# Patient Record
Sex: FEMALE | Race: ASIAN | Employment: OTHER | URBAN - METROPOLITAN AREA
[De-identification: names, ages, dates, MRNs, and addresses within clinical notes are randomized per-mention and may not be internally consistent; named-entity substitution may affect disease eponyms.]

---

## 2017-03-09 PROBLEM — K21.9 GASTROESOPHAGEAL REFLUX DISEASE WITHOUT ESOPHAGITIS: Status: ACTIVE | Noted: 2017-03-09

## 2018-01-15 PROBLEM — M17.0 PRIMARY OSTEOARTHRITIS OF BOTH KNEES: Status: ACTIVE | Noted: 2018-01-15

## 2018-02-14 PROCEDURE — 88305 TISSUE EXAM BY PATHOLOGIST: CPT | Performed by: RADIOLOGY

## 2018-02-22 PROCEDURE — 82570 ASSAY OF URINE CREATININE: CPT | Performed by: INTERNAL MEDICINE

## 2018-02-22 PROCEDURE — 82043 UR ALBUMIN QUANTITATIVE: CPT | Performed by: INTERNAL MEDICINE

## 2018-03-15 PROCEDURE — 88360 TUMOR IMMUNOHISTOCHEM/MANUAL: CPT | Performed by: RADIOLOGY

## 2018-03-15 PROCEDURE — 88341 IMHCHEM/IMCYTCHM EA ADD ANTB: CPT | Performed by: RADIOLOGY

## 2018-03-15 PROCEDURE — 88305 TISSUE EXAM BY PATHOLOGIST: CPT | Performed by: RADIOLOGY

## 2018-03-15 PROCEDURE — 88342 IMHCHEM/IMCYTCHM 1ST ANTB: CPT | Performed by: RADIOLOGY

## 2018-04-20 ENCOUNTER — OFFICE VISIT (OUTPATIENT)
Dept: SURGERY | Facility: CLINIC | Age: 73
End: 2018-04-20

## 2018-04-20 VITALS
DIASTOLIC BLOOD PRESSURE: 82 MMHG | TEMPERATURE: 99 F | BODY MASS INDEX: 27.79 KG/M2 | RESPIRATION RATE: 18 BRPM | WEIGHT: 151 LBS | HEIGHT: 62 IN | HEART RATE: 79 BPM | SYSTOLIC BLOOD PRESSURE: 139 MMHG | OXYGEN SATURATION: 99 %

## 2018-04-20 DIAGNOSIS — D05.11 BREAST NEOPLASM, TIS (DCIS), RIGHT: Primary | ICD-10-CM

## 2018-04-20 PROCEDURE — 99205 OFFICE O/P NEW HI 60 MIN: CPT | Performed by: SURGERY

## 2018-04-20 NOTE — PROGRESS NOTES
Breast Surgery New Patient Consultation    This is the first visit for this 81year old woman, referred byslef, who presents for evaluation of right DCIS.     History of Present Illness:   Ms. Ary Novoa is a 81year old woman who presents with history 250 mg by mouth nightly. Disp:  Rfl:    ergocalciferol 88224 units Oral Cap Take 1 capsule (50,000 Units total) by mouth every 7 days. 1 tab po q week Disp: 4 capsule Rfl: 3   LOSARTAN POTASSIUM 50 MG Oral Tab TAKE 1 TABLET (50 MG TOTAL) BY MOUTH DAILY.  Saeed Saunders drainage, earaches, nasal congestion, nose bleeds, snoring, pain in mouth/throat, hoarseness, change in voice, facial trauma.     Respiratory:  The patient denies chronic cough, phlegm, hemoptysis, pleurisy/chest pain, pneumonia, asthma, wheezing, difficult despair. Endocrine: There is no history of poor/slow wound healing, weight loss/gain, fertility or hormone problems, cold intolerance, +thyroid disease. Allergic/Immunologic:  There is no history of hives, hay fever, angioedema or anaphylaxis. vertebral column tenderness. Skin: The skin appears normal. There are no suspicious appearing rashes or lesions. Extremities: The extremities are without deformity, cyanosis or edema.     Impression:   Ms. Leigha Thompson is a 81year old woman presen proceeding with a mastectomy with reconstruction as a result. She has met ith Dr. Hernandez Ocampo to discuss reconstruction plans. I offered her a second opinion regarding the reconstruction. She will contact us if she wishes to have surgery with our group.  The risks

## 2018-04-26 RX ORDER — OMEPRAZOLE 20 MG/1
20 CAPSULE, DELAYED RELEASE ORAL AS NEEDED
COMMUNITY
End: 2018-10-16

## 2018-05-10 PROBLEM — C50.911 MALIGNANT NEOPLASM OF RIGHT BREAST IN FEMALE, ESTROGEN RECEPTOR POSITIVE, UNSPECIFIED SITE OF BREAST (HCC): Status: ACTIVE | Noted: 2018-05-10

## 2018-05-10 PROBLEM — M17.0 PRIMARY OSTEOARTHRITIS OF BOTH KNEES: Status: RESOLVED | Noted: 2018-01-15 | Resolved: 2018-05-10

## 2018-05-10 PROBLEM — Z17.0 MALIGNANT NEOPLASM OF RIGHT BREAST IN FEMALE, ESTROGEN RECEPTOR POSITIVE, UNSPECIFIED SITE OF BREAST (HCC): Status: ACTIVE | Noted: 2018-05-10

## 2018-05-10 PROCEDURE — 82746 ASSAY OF FOLIC ACID SERUM: CPT | Performed by: INTERNAL MEDICINE

## 2018-05-10 PROCEDURE — 82607 VITAMIN B-12: CPT | Performed by: INTERNAL MEDICINE

## 2018-05-11 ENCOUNTER — ANESTHESIA EVENT (OUTPATIENT)
Dept: SURGERY | Facility: HOSPITAL | Age: 73
End: 2018-05-11

## 2018-05-11 NOTE — PAT NURSING NOTE
Faxed chemistry report to ,surgeon to advise of elevated potassium.  Also faxed hematology report to confirm whether CBC done on 2/33/18 is acceptable for surgery

## 2018-05-14 ENCOUNTER — TELEPHONE (OUTPATIENT)
Dept: MAMMOGRAPHY | Facility: HOSPITAL | Age: 73
End: 2018-05-14

## 2018-05-14 NOTE — TELEPHONE ENCOUNTER
Spoke with patient regarding [place] Lymph Node mapping done in Nuclear Medicine department before mastectomy Thursday. Procedure explained and all questions answered.   Breast Care Coordinator to provide education and written information regarding mastec

## 2018-05-17 ENCOUNTER — HOSPITAL ENCOUNTER (INPATIENT)
Facility: HOSPITAL | Age: 73
LOS: 1 days | Discharge: HOME HEALTH | DRG: 581 | End: 2018-05-18
Attending: SURGERY | Admitting: SURGERY
Payer: MEDICARE

## 2018-05-17 ENCOUNTER — ANESTHESIA (OUTPATIENT)
Dept: SURGERY | Facility: HOSPITAL | Age: 73
End: 2018-05-17

## 2018-05-17 ENCOUNTER — HOSPITAL ENCOUNTER (OUTPATIENT)
Dept: NUCLEAR MEDICINE | Facility: HOSPITAL | Age: 73
Discharge: HOME | DRG: 581 | End: 2018-05-17
Attending: SURGERY
Payer: MEDICARE

## 2018-05-17 ENCOUNTER — SURGERY (OUTPATIENT)
Age: 73
End: 2018-05-17

## 2018-05-17 DIAGNOSIS — D05.11 DUCTAL CARCINOMA IN SITU (DCIS) OF RIGHT BREAST: ICD-10-CM

## 2018-05-17 DIAGNOSIS — D24.1 PAPILLOMA OF RIGHT BREAST: ICD-10-CM

## 2018-05-17 DIAGNOSIS — D64.9 ANEMIA, UNSPECIFIED TYPE: Primary | ICD-10-CM

## 2018-05-17 PROCEDURE — 78195 LYMPH SYSTEM IMAGING: CPT | Performed by: SURGERY

## 2018-05-17 PROCEDURE — 88341 IMHCHEM/IMCYTCHM EA ADD ANTB: CPT | Performed by: SURGERY

## 2018-05-17 PROCEDURE — 88332 PATH CONSLTJ SURG EA ADD BLK: CPT | Performed by: SURGERY

## 2018-05-17 PROCEDURE — 82962 GLUCOSE BLOOD TEST: CPT

## 2018-05-17 PROCEDURE — 88331 PATH CONSLTJ SURG 1 BLK 1SPC: CPT | Performed by: SURGERY

## 2018-05-17 PROCEDURE — 88342 IMHCHEM/IMCYTCHM 1ST ANTB: CPT | Performed by: SURGERY

## 2018-05-17 PROCEDURE — 88307 TISSUE EXAM BY PATHOLOGIST: CPT | Performed by: SURGERY

## 2018-05-17 PROCEDURE — 88305 TISSUE EXAM BY PATHOLOGIST: CPT | Performed by: SURGERY

## 2018-05-17 PROCEDURE — 84132 ASSAY OF SERUM POTASSIUM: CPT

## 2018-05-17 PROCEDURE — A4216 STERILE WATER/SALINE, 10 ML: HCPCS

## 2018-05-17 PROCEDURE — 0HRT0JZ REPLACEMENT OF RIGHT BREAST WITH SYNTHETIC SUBSTITUTE, OPEN APPROACH: ICD-10-PCS | Performed by: PLASTIC SURGERY

## 2018-05-17 PROCEDURE — 07B50ZX EXCISION OF RIGHT AXILLARY LYMPHATIC, OPEN APPROACH, DIAGNOSTIC: ICD-10-PCS | Performed by: SURGERY

## 2018-05-17 DEVICE — MATRIX TISS 20X16CM ALDRM THK: Type: IMPLANTABLE DEVICE | Site: BREAST | Status: FUNCTIONAL

## 2018-05-17 RX ORDER — GABAPENTIN 300 MG/1
300 CAPSULE ORAL NIGHTLY
Status: DISCONTINUED | OUTPATIENT
Start: 2018-05-17 | End: 2018-05-18

## 2018-05-17 RX ORDER — ENOXAPARIN SODIUM 100 MG/ML
40 INJECTION SUBCUTANEOUS DAILY
Status: DISCONTINUED | OUTPATIENT
Start: 2018-05-17 | End: 2018-05-18

## 2018-05-17 RX ORDER — GABAPENTIN 300 MG/1
300 CAPSULE ORAL NIGHTLY
COMMUNITY
End: 2018-11-15

## 2018-05-17 RX ORDER — METOCLOPRAMIDE HYDROCHLORIDE 5 MG/ML
10 INJECTION INTRAMUSCULAR; INTRAVENOUS AS NEEDED
Status: DISCONTINUED | OUTPATIENT
Start: 2018-05-17 | End: 2018-05-17 | Stop reason: HOSPADM

## 2018-05-17 RX ORDER — MEPERIDINE HYDROCHLORIDE 25 MG/ML
12.5 INJECTION INTRAMUSCULAR; INTRAVENOUS; SUBCUTANEOUS AS NEEDED
Status: DISCONTINUED | OUTPATIENT
Start: 2018-05-17 | End: 2018-05-17 | Stop reason: HOSPADM

## 2018-05-17 RX ORDER — METOCLOPRAMIDE HYDROCHLORIDE 5 MG/ML
10 INJECTION INTRAMUSCULAR; INTRAVENOUS EVERY 6 HOURS PRN
Status: DISCONTINUED | OUTPATIENT
Start: 2018-05-17 | End: 2018-05-18

## 2018-05-17 RX ORDER — LEVOTHYROXINE SODIUM 0.1 MG/1
100 TABLET ORAL
Status: DISCONTINUED | OUTPATIENT
Start: 2018-05-18 | End: 2018-05-18

## 2018-05-17 RX ORDER — NALOXONE HYDROCHLORIDE 0.4 MG/ML
80 INJECTION, SOLUTION INTRAMUSCULAR; INTRAVENOUS; SUBCUTANEOUS AS NEEDED
Status: DISCONTINUED | OUTPATIENT
Start: 2018-05-17 | End: 2018-05-17 | Stop reason: HOSPADM

## 2018-05-17 RX ORDER — DEXTROSE MONOHYDRATE 25 G/50ML
50 INJECTION, SOLUTION INTRAVENOUS
Status: DISCONTINUED | OUTPATIENT
Start: 2018-05-17 | End: 2018-05-18

## 2018-05-17 RX ORDER — CLINDAMYCIN PHOSPHATE 900 MG/50ML
900 INJECTION INTRAVENOUS ONCE
Status: DISCONTINUED | OUTPATIENT
Start: 2018-05-17 | End: 2018-05-17 | Stop reason: HOSPADM

## 2018-05-17 RX ORDER — SODIUM CHLORIDE, SODIUM LACTATE, POTASSIUM CHLORIDE, CALCIUM CHLORIDE 600; 310; 30; 20 MG/100ML; MG/100ML; MG/100ML; MG/100ML
INJECTION, SOLUTION INTRAVENOUS CONTINUOUS
Status: DISCONTINUED | OUTPATIENT
Start: 2018-05-17 | End: 2018-05-18

## 2018-05-17 RX ORDER — SODIUM CHLORIDE, SODIUM LACTATE, POTASSIUM CHLORIDE, CALCIUM CHLORIDE 600; 310; 30; 20 MG/100ML; MG/100ML; MG/100ML; MG/100ML
INJECTION, SOLUTION INTRAVENOUS CONTINUOUS
Status: DISCONTINUED | OUTPATIENT
Start: 2018-05-17 | End: 2018-05-17 | Stop reason: HOSPADM

## 2018-05-17 RX ORDER — DIAZEPAM 5 MG/1
5 TABLET ORAL AS NEEDED
Status: DISCONTINUED | OUTPATIENT
Start: 2018-05-17 | End: 2018-05-17 | Stop reason: HOSPADM

## 2018-05-17 RX ORDER — LEVOTHYROXINE SODIUM 0.1 MG/1
100 TABLET ORAL
COMMUNITY
End: 2018-10-18

## 2018-05-17 RX ORDER — LIDOCAINE AND PRILOCAINE 25; 25 MG/G; MG/G
CREAM TOPICAL ONCE
Status: DISCONTINUED | OUTPATIENT
Start: 2018-05-17 | End: 2018-05-17 | Stop reason: HOSPADM

## 2018-05-17 RX ORDER — ACETAMINOPHEN 325 MG/1
650 TABLET ORAL EVERY 4 HOURS PRN
Status: DISCONTINUED | OUTPATIENT
Start: 2018-05-17 | End: 2018-05-18

## 2018-05-17 RX ORDER — PANTOPRAZOLE SODIUM 20 MG/1
20 TABLET, DELAYED RELEASE ORAL
Status: DISCONTINUED | OUTPATIENT
Start: 2018-05-18 | End: 2018-05-18

## 2018-05-17 RX ORDER — HYDROMORPHONE HYDROCHLORIDE 1 MG/ML
0.4 INJECTION, SOLUTION INTRAMUSCULAR; INTRAVENOUS; SUBCUTANEOUS EVERY 2 HOUR PRN
Status: DISCONTINUED | OUTPATIENT
Start: 2018-05-17 | End: 2018-05-18

## 2018-05-17 RX ORDER — CALCIUM CARBONATE 200(500)MG
500 TABLET,CHEWABLE ORAL 3 TIMES DAILY PRN
Status: DISCONTINUED | OUTPATIENT
Start: 2018-05-17 | End: 2018-05-18

## 2018-05-17 RX ORDER — MELOXICAM 7.5 MG/1
7.5 TABLET ORAL DAILY
Status: ON HOLD | COMMUNITY
End: 2018-05-18

## 2018-05-17 RX ORDER — CLINDAMYCIN PHOSPHATE 900 MG/50ML
900 INJECTION INTRAVENOUS EVERY 8 HOURS
Status: COMPLETED | OUTPATIENT
Start: 2018-05-17 | End: 2018-05-18

## 2018-05-17 RX ORDER — HYDROMORPHONE HYDROCHLORIDE 1 MG/ML
0.4 INJECTION, SOLUTION INTRAMUSCULAR; INTRAVENOUS; SUBCUTANEOUS EVERY 5 MIN PRN
Status: DISCONTINUED | OUTPATIENT
Start: 2018-05-17 | End: 2018-05-17 | Stop reason: HOSPADM

## 2018-05-17 RX ORDER — CLINDAMYCIN PHOSPHATE 900 MG/50ML
INJECTION INTRAVENOUS
Status: DISCONTINUED | OUTPATIENT
Start: 2018-05-17 | End: 2018-05-17

## 2018-05-17 RX ORDER — DIPHENHYDRAMINE HYDROCHLORIDE 50 MG/ML
12.5 INJECTION INTRAMUSCULAR; INTRAVENOUS AS NEEDED
Status: DISCONTINUED | OUTPATIENT
Start: 2018-05-17 | End: 2018-05-17 | Stop reason: HOSPADM

## 2018-05-17 RX ORDER — ACETAMINOPHEN 500 MG
1000 TABLET ORAL ONCE
Status: ON HOLD | COMMUNITY
End: 2018-05-18

## 2018-05-17 RX ORDER — LOSARTAN POTASSIUM 50 MG/1
50 TABLET ORAL DAILY
COMMUNITY
End: 2018-08-18

## 2018-05-17 RX ORDER — LOSARTAN POTASSIUM 50 MG/1
50 TABLET ORAL DAILY
Status: DISCONTINUED | OUTPATIENT
Start: 2018-05-17 | End: 2018-05-18

## 2018-05-17 RX ORDER — BISACODYL 10 MG
10 SUPPOSITORY, RECTAL RECTAL
Status: DISCONTINUED | OUTPATIENT
Start: 2018-05-17 | End: 2018-05-18

## 2018-05-17 RX ORDER — DEXTROSE MONOHYDRATE 25 G/50ML
50 INJECTION, SOLUTION INTRAVENOUS
Status: DISCONTINUED | OUTPATIENT
Start: 2018-05-17 | End: 2018-05-17 | Stop reason: HOSPADM

## 2018-05-17 RX ORDER — ONDANSETRON 2 MG/ML
4 INJECTION INTRAMUSCULAR; INTRAVENOUS EVERY 6 HOURS PRN
Status: DISPENSED | OUTPATIENT
Start: 2018-05-17 | End: 2018-05-18

## 2018-05-17 RX ORDER — DOCUSATE SODIUM 100 MG/1
100 CAPSULE, LIQUID FILLED ORAL 2 TIMES DAILY
Status: DISCONTINUED | OUTPATIENT
Start: 2018-05-17 | End: 2018-05-18

## 2018-05-17 RX ORDER — HYDROMORPHONE HYDROCHLORIDE 1 MG/ML
INJECTION, SOLUTION INTRAMUSCULAR; INTRAVENOUS; SUBCUTANEOUS
Status: COMPLETED
Start: 2018-05-17 | End: 2018-05-17

## 2018-05-17 RX ORDER — ONDANSETRON 4 MG/1
4 TABLET, FILM COATED ORAL EVERY 8 HOURS PRN
Status: DISCONTINUED | OUTPATIENT
Start: 2018-05-17 | End: 2018-05-18

## 2018-05-17 RX ORDER — ISOSULFAN BLUE 50 MG/5ML
INJECTION, SOLUTION SUBCUTANEOUS AS NEEDED
Status: DISCONTINUED | OUTPATIENT
Start: 2018-05-17 | End: 2018-05-17 | Stop reason: HOSPADM

## 2018-05-17 RX ORDER — ONDANSETRON 2 MG/ML
4 INJECTION INTRAMUSCULAR; INTRAVENOUS AS NEEDED
Status: DISCONTINUED | OUTPATIENT
Start: 2018-05-17 | End: 2018-05-17 | Stop reason: HOSPADM

## 2018-05-17 NOTE — PROGRESS NOTES
Dr. Jason Alarcon paged regarding new consult. 1530: Dr. Jason Alarcon notified of new consult. Will see patient this afternoon.

## 2018-05-17 NOTE — H&P
[address] [facility] Patient Status:  Surgery Admit    1945 MRN [id number]   Location [address] [place] PRE OP HOLDING Attending Saray Robert MD   Hosp Day # 0 PCP Michael Constantino MD     Active Problems:    * No active hospital pro

## 2018-05-17 NOTE — OPERATIVE REPORT
Pre-Operative Diagnosis: Papilloma of right breast [D24.1]  Ductal carcinoma in situ (DCIS) of right breast [D05.11]     Post-Operative Diagnosis: Papilloma of right breast [D24. 1]Ductal carcinoma in situ (DCIS) of right breast [D05.11]      Procedure Perf The patient was preoperatively marked in the  standing upright position.    IV antibiotics were hung, SCDs were placed.  The patient was then brought to the  operating room, prepped and draped in usual sterile fashion, placed  supine on the operating room

## 2018-05-17 NOTE — ANESTHESIA PREPROCEDURE EVALUATION
PRE-OP EVALUATION    Patient Name: Cherri Lutz    Pre-op Diagnosis: Papilloma of right breast [D24.1]  Ductal carcinoma in situ (DCIS) of right breast [D05.11]    Procedure(s):  RIGHT BREAST TOTAL MASTECTOMY WITH RIGHT SENTINEL LYMPH NODE BIOPSY ( mouth as needed. Disp:  Rfl:    MAGNESIUM OR Take by mouth as needed. Disp:  Rfl:    Vitamin B-12 1000 MCG Oral Tab Take 1,000 mcg by mouth daily. Disp:  Rfl:    niacin 250 MG Oral Tab Take 250 mg by mouth nightly as needed.    Disp:  Rfl:    ergocalciferol 35.7 02/22/2018   MCV 64.3 (L) 02/22/2018   MCH 19.6 (L) 02/22/2018   MCHC 30.5 (L) 02/22/2018   RDW 18.7 (H) 02/22/2018    02/22/2018       Lab Results  Component Value Date    05/09/2018   K 5.20 (H) 05/10/2018    05/09/2018   CO2 27. 3

## 2018-05-17 NOTE — BRIEF OP NOTE
Pre-Operative Diagnosis: Papilloma of right breast [D24.1]  Ductal carcinoma in situ (DCIS) of right breast [D05.11]     Post-Operative Diagnosis: Papilloma of right breast [D24. 1]Ductal carcinoma in situ (DCIS) of right breast [D05.11]      Procedure Perf

## 2018-05-17 NOTE — ANESTHESIA POSTPROCEDURE EVALUATION
[address] [facility] Patient Status:  Surgery Admit   Age/Gender 81year old female MRN [id number]   Location [address] Attending Fanny Lema MD   Hosp Day # 0 PCP Ralf Rashid MD       Anesthesia Pos

## 2018-05-17 NOTE — PROGRESS NOTES
Patient c/o belching and nausea with small teal color emesis. Pt reports she did drink lemon-lime Gatorade before surgery as directed. Pt on full liquid diet but as not had much of an appetite. reglan given. Pt also requesting tums for heartburn.  Dr. Cassie Foster

## 2018-05-17 NOTE — IMAGING NOTE
Assisted nuclear med tech with SLN mapping of right breast for Mica Parsons today. Procedure explained and Mica Parsons verbalized understanding. Emotional support provided.  Written and verbal education given re lymphedema, mastecomy surgery and post

## 2018-05-17 NOTE — CONSULTS
General Medicine Consult      Reason for consult: post-op medical management     Consulted by: Dr. Prince Shea    PCP: Karon Hedrick MD    History of Present Illness:   Patient is a 81year old female with PMH sig for DM, HTN, hypothyroidism, breast canc MetFORMIN HCl  MG Oral Tablet 24 Hr Take 1 tablet (500 mg total) by mouth daily with breakfast. Disp: 90 tablet Rfl: 1   Diclofenac Sodium 75 MG Oral Tab EC Take 1 tablet (75 mg total) by mouth 2 (two) times daily. (Patient taking differently:  Take no oropharyngeal lesions  Lungs: CTA, good effort  CV: nl S1/S2  GI: soft/NT/ND +BS  Ext: nonedematous b/l LE, no clubbing or cyanosis   Neuro: moving all extremities  Psych: normal mood, normal affect  Skin: no rashes, no lesions, post-op dressing on righ records reviewed.      Jim Smith  Internal Medicine  [facility] Hospitalist  Pager: [phone]

## 2018-05-17 NOTE — H&P
Pre-op Diagnosis: Papilloma of right breast [D24.1]  Ductal carcinoma in situ (DCIS) of right breast [D05.11]    The above referenced H&P was reviewed by Carol Ayala MD on 5/17/2018, the patient was examined and no significant changes have occurred in th

## 2018-05-18 VITALS
DIASTOLIC BLOOD PRESSURE: 56 MMHG | HEART RATE: 92 BPM | OXYGEN SATURATION: 97 % | BODY MASS INDEX: 27.87 KG/M2 | SYSTOLIC BLOOD PRESSURE: 132 MMHG | TEMPERATURE: 99 F | HEIGHT: 62 IN | WEIGHT: 151.44 LBS | RESPIRATION RATE: 16 BRPM

## 2018-05-18 PROCEDURE — 82962 GLUCOSE BLOOD TEST: CPT

## 2018-05-18 PROCEDURE — 85027 COMPLETE CBC AUTOMATED: CPT | Performed by: PLASTIC SURGERY

## 2018-05-18 PROCEDURE — 80048 BASIC METABOLIC PNL TOTAL CA: CPT | Performed by: INTERNAL MEDICINE

## 2018-05-18 NOTE — PROGRESS NOTES
Pt d/c home. D/c instructions given to pt, pt's , & pt's dtr, including review of all home meds, diet, hydration, activity wound care & extensive kody drain care. Verbalized understanding of all instructions. Left unit stable via w/c.

## 2018-05-18 NOTE — PLAN OF CARE
Problem: Patient/Family Goals  Goal: Patient/Family Short Term Goal  Patient's Short Term Goal: Have adequate pain control and be free from nausea    Interventions:   - use pain medications as needed  -continue to transition to po medications when able  -a respiratory difficulty  - Respiratory Therapy support as indicated  - Manage/alleviate anxiety  - Monitor for signs/symptoms of CO2 retention   Outcome: Completed Date Met: 05/18/18  Lungs clear bilat. Maintaining sats on ra. Enc db&c & is use.      Probl adequate nutritional intake and initiate nutrition consult as needed  - Instruct patient on self management of diabetes   Outcome: Progressing  Cont accu checks & novolog as ordered

## 2018-05-18 NOTE — PLAN OF CARE
GASTROINTESTINAL - ADULT    • Minimal or absence of nausea and vomiting Progressing    • Maintains or returns to baseline bowel function Progressing        GENITOURINARY - ADULT    • Absence of urinary retention Progressing        PAIN - ADULT    • Tamela Grossman

## 2018-05-18 NOTE — HOME CARE LIAISON
[facility] NOT CONTRACTED WITH [facility] INSURANCE LEFT MESSAGE WITH AICHA SALDIVAR TO RE REFER PTNT      Thank you    Conchita Crespo

## 2018-05-18 NOTE — PROGRESS NOTES
[facility]  Progress Note    Magno Crane Patient Status:  Outpatient in a Bed    1945 MRN [id number]   [facility] 3NW-A Attending Velma Goddard MD   Hosp Day # 0 PCP Serafin Amador MD     Subjective:    Patient sitting u antibiotics at home  Drain output log for home  F/U with Dr Jcarlos Steward next week  likely home this afternoon.    D/w Dr Keshia Green, [address]  General Surgery  5/18/2018

## 2018-05-18 NOTE — PROGRESS NOTES
jose oneal from gen surgery here. States ok to d/c home. Dr Alba Maldonado paged @ this time to discuss poc d/c plans. Awaiting return call.  kody drain teaching given to pt & pt's family, along with printed material from [employer].

## 2018-05-18 NOTE — PAYOR COMM NOTE
--------------  ADMISSION REVIEW     Payor: [employer] ADV [employer]  Subscriber #:  [id number]  Authorization Number: [id number]    Admit date: N/A  Admit time: N/A       Admitting Physician: Tracy Kasper MD  Attending Physician:  Tracy Kasper MD  Primary (DILAUDID) 1 MG/ML injection 0.4 mg     Date Action Dose Route User    5/17/2018 1404 Given 0.1 mg Intravenous McGearlinAnkita, RN    5/17/2018 1359 Given 0.4 mg Intravenous SaurabhinAnkita, RN    5/17/2018 1348 Given 0.2 mg Intravenous Violetta Steen PERFORMED:  Right total mastectomy and right sentinel lymph node biopsy.   Right breast reconstruction with alloderm placement direct to implant     s/p Right breast total Mastectomy with right sentinel LN biopsy   # Papilloma of right breast and ductal car

## 2018-05-18 NOTE — HOME CARE LIAISON
TNL RE REFERRED PTNT TO [facility]  THEY ARE NOT IN NETWORK WITH PTNT [facility] AWARE AND WAS CALLING PTNT    Stephen Mayberry

## 2018-05-18 NOTE — PROGRESS NOTES
[facility] Hospitalist Progress Note                                                                   [address] [facility]  7/7/1945    CC: f/u s/p mastectomy    SUBJECTIVE:    Post op pain managed wit Glucose-Vitamin C, Calcium Carbonate Antacid    Assessment/Plan:  Active Problems:    Ductal carcinoma in situ (DCIS) of right breast    # s/p Right breast total Mastectomy with right sentinel LN biopsy   # Papilloma of right breast and ductal carcinoma in

## 2018-05-22 NOTE — CM/SW NOTE
Call received from CM from [facility]. Pt was to be set up with Tania Lopez at [facility] but has not yet received services. [facility] referral to [facility]. Referral sent - awaiting reply. 1130: BARBARA Fleming at [facility].  Per [facility] they have submitte

## 2018-05-30 PROCEDURE — 87205 SMEAR GRAM STAIN: CPT | Performed by: PLASTIC SURGERY

## 2018-05-30 PROCEDURE — 87075 CULTR BACTERIA EXCEPT BLOOD: CPT | Performed by: PLASTIC SURGERY

## 2018-05-30 PROCEDURE — 87070 CULTURE OTHR SPECIMN AEROBIC: CPT | Performed by: PLASTIC SURGERY

## 2018-10-18 ENCOUNTER — LAB ENCOUNTER (OUTPATIENT)
Dept: LAB | Age: 73
End: 2018-10-18
Attending: ORTHOPAEDIC SURGERY
Payer: MEDICARE

## 2018-10-18 DIAGNOSIS — Z01.812 PRE-OPERATIVE LABORATORY EXAMINATION: ICD-10-CM

## 2018-10-18 DIAGNOSIS — Z01.818 PREOP TESTING: ICD-10-CM

## 2018-10-18 PROCEDURE — 86850 RBC ANTIBODY SCREEN: CPT

## 2018-10-18 PROCEDURE — 36415 COLL VENOUS BLD VENIPUNCTURE: CPT | Performed by: INTERNAL MEDICINE

## 2018-10-18 PROCEDURE — 83021 HEMOGLOBIN CHROMOTOGRAPHY: CPT | Performed by: INTERNAL MEDICINE

## 2018-10-18 PROCEDURE — 87081 CULTURE SCREEN ONLY: CPT

## 2018-10-18 PROCEDURE — 86900 BLOOD TYPING SEROLOGIC ABO: CPT

## 2018-10-18 PROCEDURE — 86901 BLOOD TYPING SEROLOGIC RH(D): CPT

## 2018-10-18 NOTE — H&P (VIEW-ONLY)
Blanca Mulligan is a 72year old female who presents for a pre-operative physical exam.   Blanca Mulligan is scheduled for a R total knee procedure at 10/25/10 on [facility]  performed by Dr Shemar Fowler.  Indication: R osteoarthritis    HPI related to graham Tablet 24 Hr TAKE 1 TABLET DAILY WITH BREAKFAST. Disp: 90 tablet Rfl: 2   LOSARTAN POTASSIUM 50 MG Oral Tab TAKE 1 TABLET (50 MG TOTAL) BY MOUTH DAILY.  Disp: 90 tablet Rfl: 2   Blood Glucose Monitoring Suppl (ACCU-CHEK STARLA SMARTVIEW) w/Device Does not marco difficulty ambulating other than excepted b/l knee OA  PSYCH: denies depressed mood, anxiety  ENDOCRINE: denies cold or heat intolerance, increased thirst    PHYSICAL EXAM:   /74   Pulse 68   Temp 97.4 °F (36.3 °C) (Oral)   Resp 16   Ht 5' 2\" (1.575 Chloride      101 - 111 mmol/L 102   Carbon Dioxide, Total      22.0 - 32.0 mmol/L 26.1   CALCIUM      8.3 - 10.3 mg/dL 9.7   TOTAL PROTEIN      6.1 - 8.3 g/dL 8.3   Albumin      3.5 - 4.8 g/dL 3.8   Total Bilirubin      0.10 - 2.00 mg/dL 0.42   ALKALINE

## 2018-10-23 NOTE — H&P
[facility]    PATIENT'S NAME: Kelly Nowak   ATTENDING PHYSICIAN: Shaq Larson MD   PATIENT ACCOUNT#:   [id number]    LOCATION:  [facility]  MEDICAL RECORD #:   [id number]       YOB: 1945  ADMISSION DATE:       10/25/201 nontender. Normal bowel sounds. GENITALIA/RECTAL:  Deferred. EXTREMITIES:  The hips are nontender to internal and external rotation. Bilateral knees have crepitus of the patellofemoral joint and pain along the medial joint space.   She is lacking marco

## 2018-10-25 ENCOUNTER — ANESTHESIA EVENT (OUTPATIENT)
Dept: SURGERY | Facility: HOSPITAL | Age: 73
DRG: 470 | End: 2018-10-25
Payer: MEDICARE

## 2018-10-25 ENCOUNTER — APPOINTMENT (OUTPATIENT)
Dept: GENERAL RADIOLOGY | Facility: HOSPITAL | Age: 73
DRG: 470 | End: 2018-10-25
Attending: PHYSICIAN ASSISTANT
Payer: MEDICARE

## 2018-10-25 ENCOUNTER — ANESTHESIA (OUTPATIENT)
Dept: SURGERY | Facility: HOSPITAL | Age: 73
DRG: 470 | End: 2018-10-25
Payer: MEDICARE

## 2018-10-25 ENCOUNTER — HOSPITAL ENCOUNTER (INPATIENT)
Facility: HOSPITAL | Age: 73
LOS: 4 days | Discharge: NURSING, REHAB OR LONG-TERM CARE | DRG: 470 | End: 2018-10-29
Attending: ORTHOPAEDIC SURGERY | Admitting: ORTHOPAEDIC SURGERY
Payer: MEDICARE

## 2018-10-25 DIAGNOSIS — M17.11 PRIMARY OSTEOARTHRITIS OF RIGHT KNEE: ICD-10-CM

## 2018-10-25 DIAGNOSIS — Z01.818 PREOP TESTING: Primary | ICD-10-CM

## 2018-10-25 PROCEDURE — 82962 GLUCOSE BLOOD TEST: CPT

## 2018-10-25 PROCEDURE — 88305 TISSUE EXAM BY PATHOLOGIST: CPT | Performed by: ORTHOPAEDIC SURGERY

## 2018-10-25 PROCEDURE — 88311 DECALCIFY TISSUE: CPT | Performed by: ORTHOPAEDIC SURGERY

## 2018-10-25 PROCEDURE — 73560 X-RAY EXAM OF KNEE 1 OR 2: CPT | Performed by: PHYSICIAN ASSISTANT

## 2018-10-25 PROCEDURE — A4216 STERILE WATER/SALINE, 10 ML: HCPCS | Performed by: PHYSICIAN ASSISTANT

## 2018-10-25 PROCEDURE — 0SRC0J9 REPLACEMENT OF RIGHT KNEE JOINT WITH SYNTHETIC SUBSTITUTE, CEMENTED, OPEN APPROACH: ICD-10-PCS | Performed by: ORTHOPAEDIC SURGERY

## 2018-10-25 DEVICE — PSN TIB STM 5 DEG SZ D R: Type: IMPLANTABLE DEVICE | Site: KNEE | Status: FUNCTIONAL

## 2018-10-25 DEVICE — IMPLANTABLE DEVICE: Type: IMPLANTABLE DEVICE | Site: KNEE | Status: FUNCTIONAL

## 2018-10-25 RX ORDER — SODIUM CHLORIDE, SODIUM LACTATE, POTASSIUM CHLORIDE, CALCIUM CHLORIDE 600; 310; 30; 20 MG/100ML; MG/100ML; MG/100ML; MG/100ML
INJECTION, SOLUTION INTRAVENOUS CONTINUOUS
Status: DISCONTINUED | OUTPATIENT
Start: 2018-10-25 | End: 2018-10-29

## 2018-10-25 RX ORDER — HYDROCODONE BITARTRATE AND ACETAMINOPHEN 5; 325 MG/1; MG/1
2 TABLET ORAL AS NEEDED
Status: DISCONTINUED | OUTPATIENT
Start: 2018-10-25 | End: 2018-10-25 | Stop reason: HOSPADM

## 2018-10-25 RX ORDER — SODIUM CHLORIDE 0.9 % (FLUSH) 0.9 %
10 SYRINGE (ML) INJECTION AS NEEDED
Status: DISCONTINUED | OUTPATIENT
Start: 2018-10-25 | End: 2018-10-29

## 2018-10-25 RX ORDER — ONDANSETRON 2 MG/ML
4 INJECTION INTRAMUSCULAR; INTRAVENOUS EVERY 4 HOURS PRN
Status: DISCONTINUED | OUTPATIENT
Start: 2018-10-25 | End: 2018-10-29

## 2018-10-25 RX ORDER — DEXTROSE MONOHYDRATE 25 G/50ML
50 INJECTION, SOLUTION INTRAVENOUS AS NEEDED
Status: DISCONTINUED | OUTPATIENT
Start: 2018-10-25 | End: 2018-10-29

## 2018-10-25 RX ORDER — SODIUM CHLORIDE 9 MG/ML
INJECTION, SOLUTION INTRAVENOUS CONTINUOUS
Status: DISCONTINUED | OUTPATIENT
Start: 2018-10-25 | End: 2018-10-29

## 2018-10-25 RX ORDER — MORPHINE SULFATE 4 MG/ML
4 INJECTION, SOLUTION INTRAMUSCULAR; INTRAVENOUS EVERY 10 MIN PRN
Status: DISCONTINUED | OUTPATIENT
Start: 2018-10-25 | End: 2018-10-25 | Stop reason: HOSPADM

## 2018-10-25 RX ORDER — MIDAZOLAM HYDROCHLORIDE 1 MG/ML
INJECTION INTRAMUSCULAR; INTRAVENOUS AS NEEDED
Status: DISCONTINUED | OUTPATIENT
Start: 2018-10-25 | End: 2018-10-25 | Stop reason: SURG

## 2018-10-25 RX ORDER — HYDROCODONE BITARTRATE AND ACETAMINOPHEN 10; 325 MG/1; MG/1
2 TABLET ORAL EVERY 4 HOURS PRN
Status: DISCONTINUED | OUTPATIENT
Start: 2018-10-25 | End: 2018-10-27

## 2018-10-25 RX ORDER — ACETAMINOPHEN 500 MG
1000 TABLET ORAL EVERY 6 HOURS PRN
COMMUNITY
End: 2018-12-13 | Stop reason: ALTCHOICE

## 2018-10-25 RX ORDER — ONDANSETRON 2 MG/ML
4 INJECTION INTRAMUSCULAR; INTRAVENOUS ONCE AS NEEDED
Status: DISCONTINUED | OUTPATIENT
Start: 2018-10-25 | End: 2018-10-25 | Stop reason: HOSPADM

## 2018-10-25 RX ORDER — DIPHENHYDRAMINE HYDROCHLORIDE 50 MG/ML
25 INJECTION INTRAMUSCULAR; INTRAVENOUS ONCE AS NEEDED
Status: ACTIVE | OUTPATIENT
Start: 2018-10-25 | End: 2018-10-25

## 2018-10-25 RX ORDER — SENNOSIDES 8.6 MG
17.2 TABLET ORAL NIGHTLY
Status: DISCONTINUED | OUTPATIENT
Start: 2018-10-25 | End: 2018-10-29

## 2018-10-25 RX ORDER — SODIUM PHOSPHATE, DIBASIC AND SODIUM PHOSPHATE, MONOBASIC 7; 19 G/133ML; G/133ML
1 ENEMA RECTAL ONCE AS NEEDED
Status: DISCONTINUED | OUTPATIENT
Start: 2018-10-25 | End: 2018-10-29

## 2018-10-25 RX ORDER — METOCLOPRAMIDE 10 MG/1
10 TABLET ORAL ONCE
Status: DISCONTINUED | OUTPATIENT
Start: 2018-10-25 | End: 2018-10-25 | Stop reason: HOSPADM

## 2018-10-25 RX ORDER — DIPHENHYDRAMINE HCL 25 MG
25 CAPSULE ORAL EVERY 4 HOURS PRN
Status: DISCONTINUED | OUTPATIENT
Start: 2018-10-25 | End: 2018-10-29

## 2018-10-25 RX ORDER — DEXTROSE MONOHYDRATE 25 G/50ML
50 INJECTION, SOLUTION INTRAVENOUS
Status: DISCONTINUED | OUTPATIENT
Start: 2018-10-25 | End: 2018-10-25 | Stop reason: HOSPADM

## 2018-10-25 RX ORDER — MAGNESIUM HYDROXIDE 1200 MG/15ML
LIQUID ORAL CONTINUOUS PRN
Status: COMPLETED | OUTPATIENT
Start: 2018-10-25 | End: 2018-10-25

## 2018-10-25 RX ORDER — DIPHENHYDRAMINE HYDROCHLORIDE 50 MG/ML
12.5 INJECTION INTRAMUSCULAR; INTRAVENOUS EVERY 4 HOURS PRN
Status: DISCONTINUED | OUTPATIENT
Start: 2018-10-25 | End: 2018-10-29

## 2018-10-25 RX ORDER — GABAPENTIN 300 MG/1
300 CAPSULE ORAL NIGHTLY
Status: DISCONTINUED | OUTPATIENT
Start: 2018-10-25 | End: 2018-10-29

## 2018-10-25 RX ORDER — POLYETHYLENE GLYCOL 3350 17 G/17G
17 POWDER, FOR SOLUTION ORAL DAILY PRN
Status: DISCONTINUED | OUTPATIENT
Start: 2018-10-25 | End: 2018-10-29

## 2018-10-25 RX ORDER — BISACODYL 10 MG
10 SUPPOSITORY, RECTAL RECTAL
Status: DISCONTINUED | OUTPATIENT
Start: 2018-10-25 | End: 2018-10-29

## 2018-10-25 RX ORDER — ACETAMINOPHEN 500 MG
1000 TABLET ORAL ONCE
Status: DISCONTINUED | OUTPATIENT
Start: 2018-10-25 | End: 2018-10-25 | Stop reason: HOSPADM

## 2018-10-25 RX ORDER — NALOXONE HYDROCHLORIDE 0.4 MG/ML
80 INJECTION, SOLUTION INTRAMUSCULAR; INTRAVENOUS; SUBCUTANEOUS AS NEEDED
Status: DISCONTINUED | OUTPATIENT
Start: 2018-10-25 | End: 2018-10-25 | Stop reason: HOSPADM

## 2018-10-25 RX ORDER — LIDOCAINE HYDROCHLORIDE 10 MG/ML
INJECTION, SOLUTION EPIDURAL; INFILTRATION; INTRACAUDAL; PERINEURAL AS NEEDED
Status: DISCONTINUED | OUTPATIENT
Start: 2018-10-25 | End: 2018-10-25 | Stop reason: SURG

## 2018-10-25 RX ORDER — MORPHINE SULFATE 10 MG/ML
6 INJECTION, SOLUTION INTRAMUSCULAR; INTRAVENOUS EVERY 10 MIN PRN
Status: DISCONTINUED | OUTPATIENT
Start: 2018-10-25 | End: 2018-10-25 | Stop reason: HOSPADM

## 2018-10-25 RX ORDER — MORPHINE SULFATE 4 MG/ML
4 INJECTION, SOLUTION INTRAMUSCULAR; INTRAVENOUS ONCE
Status: COMPLETED | OUTPATIENT
Start: 2018-10-25 | End: 2018-10-25

## 2018-10-25 RX ORDER — FAMOTIDINE 20 MG/1
20 TABLET ORAL ONCE
Status: DISCONTINUED | OUTPATIENT
Start: 2018-10-25 | End: 2018-10-25 | Stop reason: HOSPADM

## 2018-10-25 RX ORDER — SODIUM CHLORIDE, SODIUM LACTATE, POTASSIUM CHLORIDE, CALCIUM CHLORIDE 600; 310; 30; 20 MG/100ML; MG/100ML; MG/100ML; MG/100ML
INJECTION, SOLUTION INTRAVENOUS CONTINUOUS
Status: DISCONTINUED | OUTPATIENT
Start: 2018-10-25 | End: 2018-10-25 | Stop reason: HOSPADM

## 2018-10-25 RX ORDER — CLINDAMYCIN PHOSPHATE 900 MG/50ML
900 INJECTION INTRAVENOUS EVERY 8 HOURS
Status: COMPLETED | OUTPATIENT
Start: 2018-10-25 | End: 2018-10-26

## 2018-10-25 RX ORDER — HALOPERIDOL 5 MG/ML
0.25 INJECTION INTRAMUSCULAR ONCE AS NEEDED
Status: DISCONTINUED | OUTPATIENT
Start: 2018-10-25 | End: 2018-10-25 | Stop reason: HOSPADM

## 2018-10-25 RX ORDER — DOCUSATE SODIUM 100 MG/1
100 CAPSULE, LIQUID FILLED ORAL 2 TIMES DAILY
Status: DISCONTINUED | OUTPATIENT
Start: 2018-10-25 | End: 2018-10-29

## 2018-10-25 RX ORDER — LEVOTHYROXINE SODIUM 0.1 MG/1
100 TABLET ORAL
Status: DISCONTINUED | OUTPATIENT
Start: 2018-10-26 | End: 2018-10-29

## 2018-10-25 RX ORDER — HYDROCODONE BITARTRATE AND ACETAMINOPHEN 10; 325 MG/1; MG/1
1 TABLET ORAL EVERY 4 HOURS PRN
Status: DISCONTINUED | OUTPATIENT
Start: 2018-10-25 | End: 2018-10-27

## 2018-10-25 RX ORDER — MORPHINE SULFATE 4 MG/ML
2 INJECTION, SOLUTION INTRAMUSCULAR; INTRAVENOUS EVERY 10 MIN PRN
Status: DISCONTINUED | OUTPATIENT
Start: 2018-10-25 | End: 2018-10-25 | Stop reason: HOSPADM

## 2018-10-25 RX ORDER — CELECOXIB 200 MG/1
200 CAPSULE ORAL 2 TIMES DAILY
Status: DISCONTINUED | OUTPATIENT
Start: 2018-10-26 | End: 2018-10-29

## 2018-10-25 RX ORDER — METOCLOPRAMIDE HYDROCHLORIDE 5 MG/ML
10 INJECTION INTRAMUSCULAR; INTRAVENOUS EVERY 6 HOURS PRN
Status: DISPENSED | OUTPATIENT
Start: 2018-10-25 | End: 2018-10-27

## 2018-10-25 RX ORDER — HYDROCODONE BITARTRATE AND ACETAMINOPHEN 5; 325 MG/1; MG/1
1 TABLET ORAL EVERY 4 HOURS PRN
Status: DISCONTINUED | OUTPATIENT
Start: 2018-10-25 | End: 2018-10-25 | Stop reason: DRUGHIGH

## 2018-10-25 RX ORDER — HYDROCODONE BITARTRATE AND ACETAMINOPHEN 5; 325 MG/1; MG/1
1 TABLET ORAL AS NEEDED
Status: DISCONTINUED | OUTPATIENT
Start: 2018-10-25 | End: 2018-10-25 | Stop reason: HOSPADM

## 2018-10-25 RX ORDER — BUPIVACAINE HYDROCHLORIDE 7.5 MG/ML
INJECTION, SOLUTION EPIDURAL; RETROBULBAR AS NEEDED
Status: DISCONTINUED | OUTPATIENT
Start: 2018-10-25 | End: 2018-10-25 | Stop reason: SURG

## 2018-10-25 RX ORDER — ASPIRIN 325 MG
325 TABLET ORAL 2 TIMES DAILY
Status: DISCONTINUED | OUTPATIENT
Start: 2018-10-25 | End: 2018-10-29

## 2018-10-25 RX ORDER — LOSARTAN POTASSIUM 50 MG/1
50 TABLET ORAL DAILY
Status: DISCONTINUED | OUTPATIENT
Start: 2018-10-26 | End: 2018-10-26

## 2018-10-25 RX ADMIN — LIDOCAINE HYDROCHLORIDE 25 MG: 10 INJECTION, SOLUTION EPIDURAL; INFILTRATION; INTRACAUDAL; PERINEURAL at 11:01:00

## 2018-10-25 RX ADMIN — LIDOCAINE HYDROCHLORIDE 50 MG: 10 INJECTION, SOLUTION EPIDURAL; INFILTRATION; INTRACAUDAL; PERINEURAL at 11:06:00

## 2018-10-25 RX ADMIN — BUPIVACAINE HYDROCHLORIDE 1.4 ML: 7.5 INJECTION, SOLUTION EPIDURAL; RETROBULBAR at 11:05:00

## 2018-10-25 RX ADMIN — MIDAZOLAM HYDROCHLORIDE 2 MG: 1 INJECTION INTRAMUSCULAR; INTRAVENOUS at 10:55:00

## 2018-10-25 RX ADMIN — SODIUM CHLORIDE, SODIUM LACTATE, POTASSIUM CHLORIDE, CALCIUM CHLORIDE: 600; 310; 30; 20 INJECTION, SOLUTION INTRAVENOUS at 10:58:00

## 2018-10-25 RX ADMIN — SODIUM CHLORIDE, SODIUM LACTATE, POTASSIUM CHLORIDE, CALCIUM CHLORIDE: 600; 310; 30; 20 INJECTION, SOLUTION INTRAVENOUS at 12:31:00

## 2018-10-25 NOTE — OPERATIVE REPORT
[facility]    PATIENT'S NAME: Misael Hinton   ATTENDING PHYSICIAN: Shaq Walker MD   OPERATING PHYSICIAN: Shaq Walker MD   PATIENT ACCOUNT#:   [id number]    LOCATION:  [facility] [address] PACU [facility] 10  MEDICAL RECORD #:   [id number] supine position. All pressure points well padded with smooth Webril over the proximal right thigh, followed by pneumatic pressure cuff. She was carefully positioned in the supine position with all pressure points well padded.   The right lower extremity w that a 14 poly was appropriate size. With trial components in place, the appropriate orientation of the tibia was marked, after the patella was measured to a size 26, punched, and good tracking was achieved.   The limb was exsanguinated and using an [employer]

## 2018-10-25 NOTE — ANESTHESIA PROCEDURE NOTES
Spinal Block  Performed by: Chandrika Torres, CRNA  Authorized by: Cassie Stephenson MD     Patient Location:  OR  Start Time:  10/25/2018 11:01 AM  End Time:  10/25/2018 11:05 AM  Site identification: surface landmarks    Reason for Block: at surgeon's

## 2018-10-25 NOTE — ANESTHESIA PREPROCEDURE EVALUATION
Anesthesia PreOp Note    HPI:     Leigha Thompson is a 72year old female who presents for preoperative consultation requested by: Mercy Reyna MD    Date of Surgery: 10/25/2018    Procedure(s):  KNEE TOTAL REPLACEMENT  Indication: Primary osteoarth 2018         Medications Prior to Admission:  acetaminophen 500 MG Oral Tab Take 1,000 mg by mouth every 6 (six) hours as needed for Pain (Pre-op).  Disp:  Rfl:  10/25/2018 at 0815   MetFORMIN HCl  MG Oral Tablet 24 Hr TAKE 1 TABLET DAILY WITH BREAKFA 0 Taking       Current Facility-Administered Medications Ordered in Epic:  lactated ringers infusion  Intravenous Continuous Kimberly García MD Last Rate: 20 mL/hr at 10/25/18 0910   acetaminophen (TYLENOL EXTRA STRENGTH) tab 1,000 mg 1,000 mg Oral Onc Value Date    WBC 6.78 10/16/2018    RBC 5.81 (H) 10/16/2018    HGB 11.3 (L) 10/16/2018    HCT 36.1 10/16/2018    MCV 62.1 (L) 10/16/2018    MCH 19.4 (L) 10/16/2018    MCHC 31.3 10/16/2018    RDW 19.1 (H) 10/16/2018     10/16/2018     Lab Results Yesenia  10/25/2018 10:20 AM

## 2018-10-25 NOTE — INTERVAL H&P NOTE
Pre-op Diagnosis: Primary osteoarthritis of right knee [M17.11]    The above referenced H&P was reviewed by Nubia Macias MD on 10/25/2018, the patient was examined and no significant changes have occurred in the patient's condition since the H&P was

## 2018-10-25 NOTE — ANESTHESIA POSTPROCEDURE EVALUATION
Patient: Dakotah Coronel    Procedure Summary     Date:  10/25/18 Room / Location:  [facility] OR [address] [place] [address] OR    Anesthesia Start:  1359 Anesthesia Stop:      Procedure:  KNEE TOTAL REPLACEMENT (Right ) Diagnosis:       Primary osteoarthritis of right k

## 2018-10-25 NOTE — H&P
[facility] Hospitalist Team  History and Physical     ASSESSMENT / PLAN:   73 yo female with HTN, GERD, DM Type II, hypothyroidism, breast cancer S/P Right Mastectomy and OA who presents for knee surgery.  Pt is S/P Right Knee Replacement,see below for details labored, CTA   CARDIOVASCULAR: regular,nl S1 S2; no murmur, no gallop,  no rub   ABDOMEN:  Soft, BS+; non distended, non tender    GENITAL/: krueger  EXTREMITIES: right leg with dressing     PMH  Past Medical History:   Diagnosis Date   • Anemia    • Cance TAKE 1 TABLET DAILY WITH BREAKFAST. Disp: 90 tablet Rfl: 2   [DISCONTINUED] LOSARTAN POTASSIUM 50 MG Oral Tab TAKE 1 TABLET (50 MG TOTAL) BY MOUTH DAILY.  Disp: 90 tablet Rfl: 2   [DISCONTINUED] Levothyroxine Sodium 100 MCG Oral Tab Take 100 mcg by mouth be Dictated by (CST): Pedrito Hendricks MD on 10/25/2018 at 15:09     Approved by (CST): Pedrito Hendricks MD on 10/25/2018 at 15:09              SEE ATTENDING NOTE BELOW      Patient examined and assessed independently. Agree with above APN assessment.      Callie Munoz

## 2018-10-26 PROBLEM — Z01.818 PREOP TESTING: Status: ACTIVE | Noted: 2018-10-26

## 2018-10-26 PROCEDURE — 82962 GLUCOSE BLOOD TEST: CPT

## 2018-10-26 PROCEDURE — 80048 BASIC METABOLIC PNL TOTAL CA: CPT | Performed by: NURSE PRACTITIONER

## 2018-10-26 PROCEDURE — 97530 THERAPEUTIC ACTIVITIES: CPT

## 2018-10-26 PROCEDURE — 97166 OT EVAL MOD COMPLEX 45 MIN: CPT

## 2018-10-26 PROCEDURE — 85014 HEMATOCRIT: CPT | Performed by: NURSE PRACTITIONER

## 2018-10-26 PROCEDURE — 97162 PT EVAL MOD COMPLEX 30 MIN: CPT

## 2018-10-26 PROCEDURE — 85018 HEMOGLOBIN: CPT | Performed by: NURSE PRACTITIONER

## 2018-10-26 PROCEDURE — 97116 GAIT TRAINING THERAPY: CPT

## 2018-10-26 PROCEDURE — 83036 HEMOGLOBIN GLYCOSYLATED A1C: CPT | Performed by: NURSE PRACTITIONER

## 2018-10-26 PROCEDURE — 85025 COMPLETE CBC W/AUTO DIFF WBC: CPT | Performed by: NURSE PRACTITIONER

## 2018-10-26 RX ORDER — MELATONIN
325 2 TIMES DAILY WITH MEALS
Status: DISCONTINUED | OUTPATIENT
Start: 2018-10-26 | End: 2018-10-29

## 2018-10-26 RX ORDER — FOLIC ACID 1 MG/1
1 TABLET ORAL DAILY
Status: DISCONTINUED | OUTPATIENT
Start: 2018-10-26 | End: 2018-10-29

## 2018-10-26 NOTE — OCCUPATIONAL THERAPY NOTE
OCCUPATIONAL THERAPY EVALUATION - INPATIENT      Room Number: 421/421-A  Evaluation Date: 10/26/2018  Type of Evaluation: Initial  Presenting Problem: r tkr    Physician Order: IP Consult to Occupational Therapy  Reason for Therapy: ADL/IADL Dysfunction an [facility]) 2018    right breast mastectomy   • Diabetes ([address]    • Disorder of thyroid    • Esophageal reflux    • High blood pressure    • HTN (hypertension)    • Hypothyroid    • IFG (impaired fasting glucose)    • Neuropathy    • Osteoarthritis    • PONV (po extremity strength is within functional limits     ACTIVITIES OF DAILY LIVING ASSESSMENT  AM-PAC ‘6-Clicks’ Inpatient Daily Activity Short Form  How much help from another person does the patient currently need…  -   Putting on and taking off regular lower

## 2018-10-26 NOTE — CM/SW NOTE
HANSA met with the pt. And her dtrs. At bedside. The pt. Lives with her  in a tri level home. The are 3 steps down to the family room and 10 up to the bedrooms. The pt. Reports being independent prior to admission with adls, ambulaiton and driving.

## 2018-10-26 NOTE — PROGRESS NOTES
[facility] Hospitalist Team  Progress Note    [facility] Patient Status:  Inpatient    1945 MRN [id number]   Location [facility] 4W/SW/SE Attending Wendy Charles MD   Hosp Day # 1 PCP Michael Constantino MD     CC: Follow Up  PCP: Carmencita Molina source Oral, resp. rate 16, height 157.5 cm (5' 2\"), weight 156 lb 11.2 oz (71.1 kg), SpO2 97 %, not currently breastfeeding.     GENERAL: no apparent distress  NEURO: A/A Ox3  RESP: non labored, CTA  CARDIO: Regular, no murmur  ABD: soft, NT, ND, BS+  EXT DiphenhydrAMINE HCl (BENADRYL) injection 12.5 mg 12.5 mg Intravenous Q4H PRN   aspirin tab 325 mg 325 mg Oral BID   gabapentin (NEURONTIN) cap 300 mg 300 mg Oral Nightly   HYDROcodone-acetaminophen (NORCO)  MG per tab 1 tablet 1 tablet Oral Q4H PRN Knee Replacement.     S/P Right Knee Replacement  -IV/PO prn pain meds.   Monitor mental status and vitals closely  -expected acute blood loss anemia, preop hemoglobin per outpatient chart review 11.3-> 7.4  -krueger  -Bowel reg, antiemetics  -DVT Prophy- ASA

## 2018-10-26 NOTE — PHYSICAL THERAPY NOTE
PHYSICAL THERAPY KNEE EVALUATION - INPATIENT       Room Number: 421/421-A  Evaluation Date: 10/26/2018  Type of Evaluation: Initial  Physician Order: PT Eval and Treat    Presenting Problem: R TKA  Reason for Therapy: Mobility Dysfunction and Discharge Raphael motion;Strengthening;Stoop training;Stair training;Transfer training;Balance training  Rehab Potential : Good  Frequency (Obs): BID       PHYSICAL THERAPY MEDICAL/SOCIAL HISTORY     Problem List  Principal Problem:    Primary osteoarthritis of right knee limits    RANGE OF MOTION AND STRENGTH ASSESSMENT  Upper extremity ROM and strength are within functional limits    Lower extremity ROM is within functional limits except for the following:   Right Knee extension 0  Right Knee flexion 80    Lower extremity activity tolerated  Gait training  Posture  ROM  Strengthening  Lower therapeutic exercise: Ankle pumps  Heel slides  Quad sets  Transfer training    Patient End of Session: Up in chair;Needs met;RN aware of session/findings;Call light within reach; Ice ap Major Depressive Disorder, severe with psychotic features versus Psychosis, unspecified type of psychosis versus Brief Psychotic Episode

## 2018-10-26 NOTE — PHYSICAL THERAPY NOTE
PHYSICAL THERAPY KNEE TREATMENT NOTE - INPATIENT     Room Number: 421/421-A             Presenting Problem: R TKA    Problem List  Principal Problem:    Primary osteoarthritis of right knee  Active Problems:    Preop testing      ASSESSMENT   Patient Costa Najera Repositioning; Activity promotion    BALANCE  Static Sitting: Good  Dynamic Sitting: Good  Static Standing: Fair +  Dynamic Standing: Fair    ACTIVITY TOLERANCE           BP: 137/55  BP Location: Left arm  BP Method: Automatic  Patient Position: Lying Status  CG assist   Goal #3 Patient is able to ambulate 300 feet with assistive device at assistance level: SBA   Goal #3   Current Status  CG assist 150ft RW    Goal #4 Patient will negotiate 10 stairs/one curb w/ assistive device and CG   Goal #4   [place]

## 2018-10-26 NOTE — PROGRESS NOTES
POD#1 s/p Right TKA  Pain controlled on orals  No nausea    Xray reviewed  Lab with post op acute anemia on top of chronic anemia    Dressing clean and dry  Calf's non tender, DNVI    Imp: POD#1 s/p Right TKA     Plan: Xeralto for 4 weeks  Rehab as [employer]

## 2018-10-27 PROCEDURE — 86900 BLOOD TYPING SEROLOGIC ABO: CPT | Performed by: INTERNAL MEDICINE

## 2018-10-27 PROCEDURE — 86920 COMPATIBILITY TEST SPIN: CPT

## 2018-10-27 PROCEDURE — A4216 STERILE WATER/SALINE, 10 ML: HCPCS | Performed by: PHYSICIAN ASSISTANT

## 2018-10-27 PROCEDURE — 85014 HEMATOCRIT: CPT | Performed by: HOSPITALIST

## 2018-10-27 PROCEDURE — 85025 COMPLETE CBC W/AUTO DIFF WBC: CPT | Performed by: NURSE PRACTITIONER

## 2018-10-27 PROCEDURE — 80048 BASIC METABOLIC PNL TOTAL CA: CPT | Performed by: NURSE PRACTITIONER

## 2018-10-27 PROCEDURE — 85027 COMPLETE CBC AUTOMATED: CPT | Performed by: INTERNAL MEDICINE

## 2018-10-27 PROCEDURE — 86901 BLOOD TYPING SEROLOGIC RH(D): CPT | Performed by: INTERNAL MEDICINE

## 2018-10-27 PROCEDURE — 82962 GLUCOSE BLOOD TEST: CPT

## 2018-10-27 PROCEDURE — 85018 HEMOGLOBIN: CPT | Performed by: HOSPITALIST

## 2018-10-27 PROCEDURE — A4216 STERILE WATER/SALINE, 10 ML: HCPCS | Performed by: HOSPITALIST

## 2018-10-27 PROCEDURE — 30233N1 TRANSFUSION OF NONAUTOLOGOUS RED BLOOD CELLS INTO PERIPHERAL VEIN, PERCUTANEOUS APPROACH: ICD-10-PCS | Performed by: HOSPITALIST

## 2018-10-27 PROCEDURE — 36430 TRANSFUSION BLD/BLD COMPNT: CPT

## 2018-10-27 PROCEDURE — 86850 RBC ANTIBODY SCREEN: CPT | Performed by: INTERNAL MEDICINE

## 2018-10-27 RX ORDER — ACETAMINOPHEN 500 MG
500 TABLET ORAL EVERY 4 HOURS PRN
Status: DISCONTINUED | OUTPATIENT
Start: 2018-10-27 | End: 2018-10-29

## 2018-10-27 RX ORDER — SODIUM CHLORIDE 0.9 % (FLUSH) 0.9 %
10 SYRINGE (ML) INJECTION AS NEEDED
Status: DISCONTINUED | OUTPATIENT
Start: 2018-10-27 | End: 2018-10-29

## 2018-10-27 RX ORDER — HYDROCODONE BITARTRATE AND ACETAMINOPHEN 5; 325 MG/1; MG/1
1 TABLET ORAL EVERY 4 HOURS PRN
Status: DISCONTINUED | OUTPATIENT
Start: 2018-10-27 | End: 2018-10-29

## 2018-10-27 RX ORDER — HYDROCODONE BITARTRATE AND ACETAMINOPHEN 10; 325 MG/1; MG/1
1 TABLET ORAL EVERY 4 HOURS PRN
Status: DISCONTINUED | OUTPATIENT
Start: 2018-10-27 | End: 2018-10-29

## 2018-10-27 RX ORDER — SODIUM CHLORIDE 9 MG/ML
INJECTION, SOLUTION INTRAVENOUS ONCE
Status: COMPLETED | OUTPATIENT
Start: 2018-10-27 | End: 2018-10-27

## 2018-10-27 NOTE — PHYSICAL THERAPY NOTE
Pt with low Hgb today 7.4, awaiting for MD decision regarding blood transfusion per RN. Will follow up when pt appropriated.

## 2018-10-27 NOTE — PROGRESS NOTES
[facility] Hospitalist Progress Note     CC: Hospital Follow up    PCP: Asha Winkler MD       Assessment/Plan:     Principal Problem:    Primary osteoarthritis of right knee  Active Problems:    Preop testing    Ms. Benjamin Contreras is a 73 yo female with HTN, GERD, Oral, resp. rate 16, height 157.5 cm (5' 2\"), weight 156 lb 11.2 oz (71.1 kg), SpO2 97 %, not currently breastfeeding.     Temp:  [97.9 °F (36.6 °C)-98 °F (36.7 °C)] 97.9 °F (36.6 °C)  Pulse:  [] 105  Resp:  [16] 16  BP: (118-142)/(43-55) 126/46 Approved by (CST):  Terrell See MD on 10/25/2018 at 15:09              Meds:     • ferrous sulfate  325 mg Oral BID with meals   • folic acid  1 mg Oral Daily   • celecoxib  200 mg Oral BID   • Senna  17.2 mg Oral Nightly   • docusate sodium  100 mg O

## 2018-10-28 ENCOUNTER — APPOINTMENT (OUTPATIENT)
Dept: ULTRASOUND IMAGING | Facility: HOSPITAL | Age: 73
DRG: 470 | End: 2018-10-28
Attending: HOSPITALIST
Payer: MEDICARE

## 2018-10-28 PROCEDURE — 97116 GAIT TRAINING THERAPY: CPT

## 2018-10-28 PROCEDURE — 85027 COMPLETE CBC AUTOMATED: CPT | Performed by: HOSPITALIST

## 2018-10-28 PROCEDURE — 93970 EXTREMITY STUDY: CPT | Performed by: HOSPITALIST

## 2018-10-28 PROCEDURE — 97110 THERAPEUTIC EXERCISES: CPT

## 2018-10-28 PROCEDURE — 82962 GLUCOSE BLOOD TEST: CPT

## 2018-10-28 NOTE — CM/SW NOTE
Clinical updates sent via [employer] to Neris arguelles to continue the insurance authorization.     HERSON gloria [id number]

## 2018-10-28 NOTE — PHYSICAL THERAPY NOTE
Pt is seen BID today. Please refer to AM note for PT recommendation.   Bed mobility:  SBA  Transfers:  CGA with RW  Gait:  300' with RW, CGA  Stairs:  10 steps, 1 rail, 2 hand support, CGA

## 2018-10-28 NOTE — PHYSICAL THERAPY NOTE
PHYSICAL THERAPY KNEE TREATMENT NOTE - INPATIENT     Room Number: 421/421-A          Presenting Problem: R TKA    Problem List  Principal Problem:    Primary osteoarthritis of right knee  Active Problems:    Preop testing      Past Medical History   Past (including adjusting bedclothes, sheets and blankets)?: None   -   Sitting down on and standing up from a chair with arms (e.g., wheelchair, bedside commode, etc.): A Little   -   Moving from lying on back to sitting on the side of the bed?: A Little   How but the SpO2 was at 99%, room air with HR 93 bpm.  The pt is still below her baseline level of function and would still require continued skilled physical therapy while in-house to address pts impairments and functional limitations.   HEP was reviewed with

## 2018-10-28 NOTE — PROGRESS NOTES
[facility] Hospitalist Progress Note     CC: Hospital Follow up    PCP: Kina Jenkins MD       Assessment/Plan:     Principal Problem:    Primary osteoarthritis of right knee  Active Problems:    Preop testing    Ms. Tao Simon is a 73 yo female with HTN, GERD, pain or tightness. Hg 7.4 yesterday -> 9.4 post transfusion -> 8.5 today, no blood loss. Having calf and thigh pain as well. Plans to do stairs with PT this afternoon, nervous and worried about 3 level home.      OBJECTIVE:    Blood pressure 123/45, pulse 8 AMYLASE, LIPASE, LDH in the last 168 hours. Invalid input(s): ALPHOS, TBIL, DBIL, TPROT      Imaging:  Xr Knee (1 Or 2 Views), Right (cpt=[id number])    Result Date: 10/25/2018  CONCLUSION:  * Total knee arthroplasty is identified.  * The prostheses are well

## 2018-10-28 NOTE — PROGRESS NOTES
[address] [facility] Patient Status:  Inpatient    1945 MRN [id number]   Location [facility] 4W/SW/SE Attending Sejal Anderson MD   Hosp Day # 3 PCP Margie Melendez MD         S:  Patient doing well. No nausea.   No SOB

## 2018-10-29 ENCOUNTER — APPOINTMENT (OUTPATIENT)
Dept: PHYSICAL THERAPY | Facility: HOSPITAL | Age: 73
DRG: 470 | End: 2018-10-29
Attending: ORTHOPAEDIC SURGERY
Payer: MEDICARE

## 2018-10-29 VITALS
DIASTOLIC BLOOD PRESSURE: 54 MMHG | WEIGHT: 156.69 LBS | OXYGEN SATURATION: 98 % | HEIGHT: 62 IN | HEART RATE: 78 BPM | BODY MASS INDEX: 28.83 KG/M2 | RESPIRATION RATE: 16 BRPM | SYSTOLIC BLOOD PRESSURE: 124 MMHG | TEMPERATURE: 97 F

## 2018-10-29 PROBLEM — Z01.818 PREOP TESTING: Status: RESOLVED | Noted: 2018-10-26 | Resolved: 2018-10-29

## 2018-10-29 PROCEDURE — 97530 THERAPEUTIC ACTIVITIES: CPT

## 2018-10-29 PROCEDURE — 85027 COMPLETE CBC AUTOMATED: CPT | Performed by: HOSPITALIST

## 2018-10-29 PROCEDURE — 97116 GAIT TRAINING THERAPY: CPT

## 2018-10-29 PROCEDURE — 82962 GLUCOSE BLOOD TEST: CPT

## 2018-10-29 PROCEDURE — 97110 THERAPEUTIC EXERCISES: CPT

## 2018-10-29 RX ORDER — PANTOPRAZOLE SODIUM 40 MG/1
40 TABLET, DELAYED RELEASE ORAL
Status: DISCONTINUED | OUTPATIENT
Start: 2018-10-29 | End: 2018-10-29

## 2018-10-29 RX ORDER — PSEUDOEPHEDRINE HCL 30 MG
100 TABLET ORAL 2 TIMES DAILY
Qty: 60 CAPSULE | Refills: 0 | Status: SHIPPED | OUTPATIENT
Start: 2018-10-29 | End: 2018-12-13 | Stop reason: ALTCHOICE

## 2018-10-29 RX ORDER — HYDROCODONE BITARTRATE AND ACETAMINOPHEN 5; 325 MG/1; MG/1
1 TABLET ORAL EVERY 4 HOURS PRN
Qty: 10 TABLET | Refills: 0 | Status: SHIPPED | OUTPATIENT
Start: 2018-10-29 | End: 2019-04-18 | Stop reason: ALTCHOICE

## 2018-10-29 RX ORDER — POLYETHYLENE GLYCOL 3350 17 G/17G
17 POWDER, FOR SOLUTION ORAL DAILY PRN
Qty: 7 EACH | Refills: 0 | Status: SHIPPED | OUTPATIENT
Start: 2018-10-29 | End: 2018-12-13 | Stop reason: ALTCHOICE

## 2018-10-29 RX ORDER — ASPIRIN 325 MG
325 TABLET ORAL 2 TIMES DAILY
Qty: 60 TABLET | Refills: 0 | Status: SHIPPED | OUTPATIENT
Start: 2018-10-29 | End: 2019-04-18 | Stop reason: ALTCHOICE

## 2018-10-29 RX ORDER — CELECOXIB 200 MG/1
200 CAPSULE ORAL DAILY
Qty: 1 CAPSULE | Refills: 0 | Status: SHIPPED | OUTPATIENT
Start: 2018-10-29 | End: 2018-11-15 | Stop reason: ALTCHOICE

## 2018-10-29 RX ORDER — PANTOPRAZOLE SODIUM 40 MG/1
40 TABLET, DELAYED RELEASE ORAL
Qty: 30 TABLET | Refills: 0 | Status: SHIPPED | OUTPATIENT
Start: 2018-10-29 | End: 2018-11-15

## 2018-10-29 RX ORDER — MELATONIN
325 2 TIMES DAILY WITH MEALS
Qty: 30 TABLET | Refills: 0 | Status: SHIPPED | OUTPATIENT
Start: 2018-10-29 | End: 2018-12-13 | Stop reason: ALTCHOICE

## 2018-10-29 NOTE — DISCHARGE SUMMARY
[facility] Hospitalist Discharge Summary   Patient ID:  Savannah Mitchell  [id number]  71 year old  7/7/1945    Admit date: 10/25/2018  Discharge date: 10/29/2018    Primary Care Physician: Noemí Johnson MD   Attending Physician: Ronnie Villeda MD   Con iron added  -US LE without DVT  -Bowel reg  -DVT Prophy- ASA  -follow up with ortho     Acute on Chronic Anemia- hx Thalassemia Minor  -per office records hs of thalassemia minor  -outpt hgb 8.5-11.3, post op hgb 7.4, 1 unit PRBC's given on 10/27, D/C hgb total) by mouth daily.  TAKE 2 CAPS THE DAY PRIOR TO SURGERY, 2 CAPS THE MORNING OF SURGERY WITH A SIP OF WATER, 1 CAP DAILY AFTER SURGERY  What changed:    · how much to take  · how to take this  · when to take this     HYDROcodone-acetaminophen 5-325 MG T Specialty:  Internal Medicine  Why:  within one week of discharge from rehab  Contact information:  [address] [facility] [address] Madeleine Hutchins MD On 11/5/2018.     Specialty:  SURGERY, ORTHOPEDIC  Why

## 2018-10-29 NOTE — PROGRESS NOTES
[facility] Hospitalist Team  Progress Note    Kasia Hernandez Patient Status:  Inpatient    1945 MRN [id number]   Location [facility] 4W/SW/SE Attending Elayne Dasilva MD   Hosp Day # 4 PCP Juice Laurent MD     CC: Follow Up  PCP: Chey Broderick care with Dr. Ericka Rodrigues RN, NP   [place]  Hospitalist Team  Pager [phone]   Answering Service number: [phone]  10/29/2018    SUBJECTIVE:   Had some epigastric pain intermittently yesterday evening, so far ok this am. A Oral Q4H PRN   HYDROcodone-acetaminophen (NORCO)  MG per tab 1 tablet 1 tablet Oral Q4H PRN   acetaminophen (TYLENOL EXTRA STRENGTH) tab 500 mg 500 mg Oral Q4H PRN   ferrous sulfate EC tab 325 mg 325 mg Oral BID with meals   folic acid (FOLVITE) tab better. No CP or SOB. Voiding well. Denies N/V.   Tolerating diet, +BM this admission     objective  /54 (BP Location: Left arm)   Pulse 78   Temp 97.2 °F (36.2 °C) (Oral)   Resp 16   Ht 157.5 cm (5' 2\")   Wt 156 lb 11.2 oz (71.1 kg)   SpO2 98%

## 2018-10-29 NOTE — CM/SW NOTE
SW followed up with [facility]. Per Vamsi Salvador, they are full at this time. SW followed up with [facility]- per Cisco Middleton, they need updates. Updates sent. Insurance auth. is also going to be needed.        Florinda Celaya, MSW., [id number]

## 2018-10-29 NOTE — CM/SW NOTE
HNASA followed up with Oren Taylor at [facility]. Bruno Sandy   has been received, patient can be accepted today at 5 pm.    Family will transport.     Room # 0487    Report [phone]      FIDEL Ramos., [id number]

## 2018-10-29 NOTE — PHYSICAL THERAPY NOTE
PHYSICAL THERAPY KNEE TREATMENT NOTE - INPATIENT     Room Number: 421/421-A          Presenting Problem: R TKA    Problem List  Principal Problem:    Primary osteoarthritis of right knee      Past Medical History   Past Medical History:   Diagnosis Date Sitting down on and standing up from a chair with arms (e.g., wheelchair, bedside commode, etc.): A Little   -   Moving from lying on back to sitting on the side of the bed?: A Little   How much help from another person does the patient currently need. .. mobility   Goal #2 Patient is able to demonstrate transfers Sit to/from Stand at assistance level: modified independent      Goal #2  Current Status  Min a with RW   Goal #3 Patient is able to ambulate 300 feet with assistive device at assistance level: SB

## 2019-01-18 PROBLEM — Z96.651 S/P TOTAL KNEE REPLACEMENT, RIGHT: Status: ACTIVE | Noted: 2019-01-18

## 2019-04-10 PROBLEM — Z85.3 HISTORY OF RIGHT BREAST CANCER: Status: ACTIVE | Noted: 2018-05-10

## 2019-04-10 PROBLEM — D05.11 DUCTAL CARCINOMA IN SITU (DCIS) OF RIGHT BREAST: Status: RESOLVED | Noted: 2018-05-17 | Resolved: 2019-04-10

## 2019-04-18 PROBLEM — Z85.3 HISTORY OF RIGHT BREAST CANCER: Status: RESOLVED | Noted: 2018-05-10 | Resolved: 2019-04-18

## 2019-04-18 PROBLEM — M17.11 PRIMARY OSTEOARTHRITIS OF RIGHT KNEE: Status: RESOLVED | Noted: 2018-10-25 | Resolved: 2019-04-18

## 2019-04-18 PROBLEM — C50.411 MALIGNANT NEOPLASM OF UPPER-OUTER QUADRANT OF RIGHT BREAST IN FEMALE, ESTROGEN RECEPTOR POSITIVE (HCC): Status: ACTIVE | Noted: 2019-04-18

## 2019-04-18 PROBLEM — Z17.0 MALIGNANT NEOPLASM OF UPPER-OUTER QUADRANT OF RIGHT BREAST IN FEMALE, ESTROGEN RECEPTOR POSITIVE (HCC): Status: ACTIVE | Noted: 2019-04-18

## 2019-04-18 PROBLEM — Z96.651 S/P TOTAL KNEE REPLACEMENT, RIGHT: Status: RESOLVED | Noted: 2019-01-18 | Resolved: 2019-04-18

## 2019-05-09 PROCEDURE — 88305 TISSUE EXAM BY PATHOLOGIST: CPT | Performed by: INTERNAL MEDICINE

## 2019-10-10 PROBLEM — D56.3 ANEMIA, HEMOLYTIC, THALASSEMIA MINOR: Status: ACTIVE | Noted: 2019-10-10

## 2019-10-10 PROBLEM — Z90.11: Status: ACTIVE | Noted: 2019-10-10

## 2020-03-31 PROBLEM — N18.30 CKD (CHRONIC KIDNEY DISEASE) STAGE 3, GFR 30-59 ML/MIN (HCC): Status: ACTIVE | Noted: 2020-03-31

## 2020-05-06 PROBLEM — E11.29 TYPE 2 DIABETES MELLITUS WITH KIDNEY COMPLICATION, WITH LONG-TERM CURRENT USE OF INSULIN (HCC): Status: ACTIVE | Noted: 2020-05-06

## 2020-05-06 PROBLEM — Z79.4 TYPE 2 DIABETES MELLITUS WITH KIDNEY COMPLICATION, WITH LONG-TERM CURRENT USE OF INSULIN (HCC): Status: ACTIVE | Noted: 2020-05-06

## 2020-05-06 PROBLEM — N18.30 CKD (CHRONIC KIDNEY DISEASE) STAGE 3, GFR 30-59 ML/MIN (HCC): Status: RESOLVED | Noted: 2020-03-31 | Resolved: 2020-05-06

## 2020-05-06 PROBLEM — E11.21 DIABETIC NEPHROPATHY (HCC): Status: ACTIVE | Noted: 2020-05-06

## 2020-10-01 PROBLEM — C50.411 MALIGNANT NEOPLASM OF UPPER-OUTER QUADRANT OF RIGHT BREAST IN FEMALE, ESTROGEN RECEPTOR POSITIVE (HCC): Status: RESOLVED | Noted: 2019-04-18 | Resolved: 2020-10-01

## 2020-10-01 PROBLEM — Z17.0 MALIGNANT NEOPLASM OF UPPER-OUTER QUADRANT OF RIGHT BREAST IN FEMALE, ESTROGEN RECEPTOR POSITIVE (HCC): Status: RESOLVED | Noted: 2019-04-18 | Resolved: 2020-10-01

## 2020-10-01 PROBLEM — D05.11 DUCTAL CARCINOMA IN SITU (DCIS) OF RIGHT BREAST: Status: RESOLVED | Noted: 2018-05-17 | Resolved: 2020-10-01

## 2020-10-01 PROBLEM — Z90.11: Status: RESOLVED | Noted: 2019-10-10 | Resolved: 2020-10-01

## 2021-04-22 PROBLEM — N18.30 TYPE 2 DIABETES MELLITUS WITH STAGE 3 CHRONIC KIDNEY DISEASE, WITH LONG-TERM CURRENT USE OF INSULIN (HCC): Status: ACTIVE | Noted: 2020-05-06

## 2021-04-22 PROBLEM — E11.22 TYPE 2 DIABETES MELLITUS WITH STAGE 3 CHRONIC KIDNEY DISEASE, WITH LONG-TERM CURRENT USE OF INSULIN (HCC): Status: RESOLVED | Noted: 2020-05-06 | Resolved: 2021-04-22

## 2021-04-22 PROBLEM — Z79.4 TYPE 2 DIABETES MELLITUS WITH STAGE 3 CHRONIC KIDNEY DISEASE, WITH LONG-TERM CURRENT USE OF INSULIN (HCC): Status: RESOLVED | Noted: 2020-05-06 | Resolved: 2021-04-22

## 2021-04-22 PROBLEM — N18.30 TYPE 2 DIABETES MELLITUS WITH STAGE 3 CHRONIC KIDNEY DISEASE, WITH LONG-TERM CURRENT USE OF INSULIN (HCC): Status: RESOLVED | Noted: 2020-05-06 | Resolved: 2021-04-22

## 2021-04-22 PROBLEM — Z79.4 TYPE 2 DIABETES MELLITUS WITH STAGE 3 CHRONIC KIDNEY DISEASE, WITH LONG-TERM CURRENT USE OF INSULIN (HCC): Status: ACTIVE | Noted: 2020-05-06

## 2021-04-22 PROBLEM — E11.22 TYPE 2 DIABETES MELLITUS WITH STAGE 3 CHRONIC KIDNEY DISEASE, WITH LONG-TERM CURRENT USE OF INSULIN (HCC): Status: ACTIVE | Noted: 2020-05-06

## 2023-04-25 ENCOUNTER — LABORATORY ENCOUNTER (OUTPATIENT)
Dept: LAB | Facility: HOSPITAL | Age: 78
DRG: 470 | End: 2023-04-25
Attending: ORTHOPAEDIC SURGERY
Payer: MEDICARE

## 2023-04-25 ENCOUNTER — LAB ENCOUNTER (OUTPATIENT)
Dept: LAB | Age: 78
DRG: 470 | End: 2023-04-25
Attending: ORTHOPAEDIC SURGERY
Payer: MEDICARE

## 2023-04-25 DIAGNOSIS — Z01.818 PRE-OP TESTING: ICD-10-CM

## 2023-04-25 LAB
ANTIBODY SCREEN: NEGATIVE
EST. AVERAGE GLUCOSE BLD GHB EST-MCNC: 151 MG/DL (ref 68–126)
HBA1C MFR BLD: 6.9 % (ref ?–5.7)
MRSA DNA SPEC QL NAA+PROBE: NEGATIVE
RH BLOOD TYPE: POSITIVE

## 2023-04-25 PROCEDURE — 86900 BLOOD TYPING SEROLOGIC ABO: CPT

## 2023-04-25 PROCEDURE — 36415 COLL VENOUS BLD VENIPUNCTURE: CPT

## 2023-04-25 PROCEDURE — 83036 HEMOGLOBIN GLYCOSYLATED A1C: CPT

## 2023-04-25 PROCEDURE — 86850 RBC ANTIBODY SCREEN: CPT

## 2023-04-25 PROCEDURE — 87641 MR-STAPH DNA AMP PROBE: CPT

## 2023-04-25 PROCEDURE — 86901 BLOOD TYPING SEROLOGIC RH(D): CPT

## 2023-04-25 PROCEDURE — 86920 COMPATIBILITY TEST SPIN: CPT

## 2023-04-25 RX ORDER — ACETAMINOPHEN 325 MG/1
325 TABLET ORAL EVERY 6 HOURS PRN
COMMUNITY

## 2023-04-26 ENCOUNTER — ANESTHESIA (OUTPATIENT)
Dept: SURGERY | Facility: HOSPITAL | Age: 78
DRG: 470 | End: 2023-04-26
Payer: MEDICARE

## 2023-04-26 ENCOUNTER — ANESTHESIA EVENT (OUTPATIENT)
Dept: SURGERY | Facility: HOSPITAL | Age: 78
DRG: 470 | End: 2023-04-26
Payer: MEDICARE

## 2023-04-26 ENCOUNTER — HOSPITAL ENCOUNTER (INPATIENT)
Facility: HOSPITAL | Age: 78
LOS: 2 days | Discharge: HOME HEALTH | DRG: 470 | End: 2023-04-29
Attending: ORTHOPAEDIC SURGERY | Admitting: ORTHOPAEDIC SURGERY
Payer: MEDICARE

## 2023-04-26 ENCOUNTER — APPOINTMENT (OUTPATIENT)
Dept: GENERAL RADIOLOGY | Facility: HOSPITAL | Age: 78
DRG: 470 | End: 2023-04-26
Attending: PHYSICIAN ASSISTANT
Payer: MEDICARE

## 2023-04-26 DIAGNOSIS — Z96.652 S/P TOTAL KNEE ARTHROPLASTY, LEFT: ICD-10-CM

## 2023-04-26 DIAGNOSIS — Z01.818 PRE-OP TESTING: Primary | ICD-10-CM

## 2023-04-26 LAB
GLUCOSE BLDC GLUCOMTR-MCNC: 126 MG/DL (ref 70–99)
GLUCOSE BLDC GLUCOMTR-MCNC: 152 MG/DL (ref 70–99)
GLUCOSE BLDC GLUCOMTR-MCNC: 155 MG/DL (ref 70–99)
GLUCOSE BLDC GLUCOMTR-MCNC: 266 MG/DL (ref 70–99)
GLUCOSE BLDC GLUCOMTR-MCNC: 287 MG/DL (ref 70–99)

## 2023-04-26 PROCEDURE — 73560 X-RAY EXAM OF KNEE 1 OR 2: CPT | Performed by: PHYSICIAN ASSISTANT

## 2023-04-26 PROCEDURE — 0SRD0J9 REPLACEMENT OF LEFT KNEE JOINT WITH SYNTHETIC SUBSTITUTE, CEMENTED, OPEN APPROACH: ICD-10-PCS | Performed by: ORTHOPAEDIC SURGERY

## 2023-04-26 PROCEDURE — 88311 DECALCIFY TISSUE: CPT | Performed by: ORTHOPAEDIC SURGERY

## 2023-04-26 PROCEDURE — 82962 GLUCOSE BLOOD TEST: CPT

## 2023-04-26 PROCEDURE — 3E0T3BZ INTRODUCTION OF ANESTHETIC AGENT INTO PERIPHERAL NERVES AND PLEXI, PERCUTANEOUS APPROACH: ICD-10-PCS | Performed by: ORTHOPAEDIC SURGERY

## 2023-04-26 PROCEDURE — 64447 NJX AA&/STRD FEMORAL NRV IMG: CPT | Performed by: ORTHOPAEDIC SURGERY

## 2023-04-26 PROCEDURE — 8E0Y0CZ ROBOTIC ASSISTED PROCEDURE OF LOWER EXTREMITY, OPEN APPROACH: ICD-10-PCS | Performed by: ORTHOPAEDIC SURGERY

## 2023-04-26 PROCEDURE — 88305 TISSUE EXAM BY PATHOLOGIST: CPT | Performed by: ORTHOPAEDIC SURGERY

## 2023-04-26 DEVICE — PSN FEM CR CMT CCR NRW SZ7 L: Type: IMPLANTABLE DEVICE | Site: KNEE | Status: FUNCTIONAL

## 2023-04-26 DEVICE — REFOBACIN BC R 1X40 US: Type: IMPLANTABLE DEVICE | Site: KNEE | Status: FUNCTIONAL

## 2023-04-26 DEVICE — PSN TIB STM 5 DEG SZ C L: Type: IMPLANTABLE DEVICE | Site: KNEE | Status: FUNCTIONAL

## 2023-04-26 DEVICE — NEX ALL-POLY PAT 29MM: Type: IMPLANTABLE DEVICE | Site: KNEE | Status: FUNCTIONAL

## 2023-04-26 DEVICE — IMPLANTABLE DEVICE: Type: IMPLANTABLE DEVICE | Site: KNEE | Status: FUNCTIONAL

## 2023-04-26 RX ORDER — MORPHINE SULFATE 2 MG/ML
2 INJECTION, SOLUTION INTRAMUSCULAR; INTRAVENOUS EVERY 2 HOUR PRN
Status: ACTIVE | OUTPATIENT
Start: 2023-04-26 | End: 2023-04-27

## 2023-04-26 RX ORDER — DIPHENHYDRAMINE HYDROCHLORIDE 50 MG/ML
25 INJECTION INTRAMUSCULAR; INTRAVENOUS ONCE AS NEEDED
Status: ACTIVE | OUTPATIENT
Start: 2023-04-26 | End: 2023-04-26

## 2023-04-26 RX ORDER — NICOTINE POLACRILEX 4 MG
30 LOZENGE BUCCAL
Status: DISCONTINUED | OUTPATIENT
Start: 2023-04-26 | End: 2023-04-26 | Stop reason: HOSPADM

## 2023-04-26 RX ORDER — LEVOTHYROXINE SODIUM 0.1 MG/1
100 TABLET ORAL
Status: DISCONTINUED | OUTPATIENT
Start: 2023-04-27 | End: 2023-04-29

## 2023-04-26 RX ORDER — MORPHINE SULFATE 4 MG/ML
2 INJECTION, SOLUTION INTRAMUSCULAR; INTRAVENOUS EVERY 10 MIN PRN
Status: DISCONTINUED | OUTPATIENT
Start: 2023-04-26 | End: 2023-04-26 | Stop reason: HOSPADM

## 2023-04-26 RX ORDER — LIDOCAINE HYDROCHLORIDE 10 MG/ML
INJECTION, SOLUTION INFILTRATION; PERINEURAL
Status: COMPLETED | OUTPATIENT
Start: 2023-04-26 | End: 2023-04-26

## 2023-04-26 RX ORDER — SODIUM CHLORIDE, SODIUM LACTATE, POTASSIUM CHLORIDE, CALCIUM CHLORIDE 600; 310; 30; 20 MG/100ML; MG/100ML; MG/100ML; MG/100ML
INJECTION, SOLUTION INTRAVENOUS CONTINUOUS
Status: DISCONTINUED | OUTPATIENT
Start: 2023-04-26 | End: 2023-04-29

## 2023-04-26 RX ORDER — FAMOTIDINE 10 MG/ML
20 INJECTION, SOLUTION INTRAVENOUS 2 TIMES DAILY
Status: DISCONTINUED | OUTPATIENT
Start: 2023-04-26 | End: 2023-04-29

## 2023-04-26 RX ORDER — ACETAMINOPHEN 500 MG
1000 TABLET ORAL EVERY 8 HOURS
Status: COMPLETED | OUTPATIENT
Start: 2023-04-27 | End: 2023-04-27

## 2023-04-26 RX ORDER — ONDANSETRON 2 MG/ML
4 INJECTION INTRAMUSCULAR; INTRAVENOUS EVERY 6 HOURS PRN
Status: DISCONTINUED | OUTPATIENT
Start: 2023-04-26 | End: 2023-04-29

## 2023-04-26 RX ORDER — TRANEXAMIC ACID 650 MG/1
1300 TABLET ORAL ONCE
Status: COMPLETED | OUTPATIENT
Start: 2023-04-26 | End: 2023-04-26

## 2023-04-26 RX ORDER — DEXTROSE MONOHYDRATE 25 G/50ML
50 INJECTION, SOLUTION INTRAVENOUS
Status: DISCONTINUED | OUTPATIENT
Start: 2023-04-26 | End: 2023-04-26 | Stop reason: HOSPADM

## 2023-04-26 RX ORDER — BISACODYL 10 MG
10 SUPPOSITORY, RECTAL RECTAL
Status: DISCONTINUED | OUTPATIENT
Start: 2023-04-26 | End: 2023-04-29

## 2023-04-26 RX ORDER — NICOTINE POLACRILEX 4 MG
15 LOZENGE BUCCAL
Status: DISCONTINUED | OUTPATIENT
Start: 2023-04-26 | End: 2023-04-26 | Stop reason: HOSPADM

## 2023-04-26 RX ORDER — ASPIRIN 81 MG/1
81 TABLET ORAL 2 TIMES DAILY
Status: DISCONTINUED | OUTPATIENT
Start: 2023-04-26 | End: 2023-04-29

## 2023-04-26 RX ORDER — EPHEDRINE SULFATE 50 MG/ML
INJECTION INTRAVENOUS AS NEEDED
Status: DISCONTINUED | OUTPATIENT
Start: 2023-04-26 | End: 2023-04-26 | Stop reason: SURG

## 2023-04-26 RX ORDER — SODIUM CHLORIDE, SODIUM LACTATE, POTASSIUM CHLORIDE, CALCIUM CHLORIDE 600; 310; 30; 20 MG/100ML; MG/100ML; MG/100ML; MG/100ML
INJECTION, SOLUTION INTRAVENOUS CONTINUOUS
Status: DISCONTINUED | OUTPATIENT
Start: 2023-04-26 | End: 2023-04-26 | Stop reason: HOSPADM

## 2023-04-26 RX ORDER — SODIUM CHLORIDE 9 MG/ML
INJECTION, SOLUTION INTRAVENOUS CONTINUOUS
Status: DISCONTINUED | OUTPATIENT
Start: 2023-04-26 | End: 2023-04-29

## 2023-04-26 RX ORDER — MORPHINE SULFATE 4 MG/ML
6 INJECTION, SOLUTION INTRAMUSCULAR; INTRAVENOUS EVERY 2 HOUR PRN
Status: ACTIVE | OUTPATIENT
Start: 2023-04-26 | End: 2023-04-27

## 2023-04-26 RX ORDER — HYDROCODONE BITARTRATE AND ACETAMINOPHEN 10; 325 MG/1; MG/1
1 TABLET ORAL EVERY 6 HOURS PRN
Status: DISCONTINUED | OUTPATIENT
Start: 2023-04-26 | End: 2023-04-28

## 2023-04-26 RX ORDER — LIDOCAINE HYDROCHLORIDE 10 MG/ML
INJECTION, SOLUTION EPIDURAL; INFILTRATION; INTRACAUDAL; PERINEURAL AS NEEDED
Status: DISCONTINUED | OUTPATIENT
Start: 2023-04-26 | End: 2023-04-26 | Stop reason: SURG

## 2023-04-26 RX ORDER — POLYETHYLENE GLYCOL 3350 17 G/17G
17 POWDER, FOR SOLUTION ORAL DAILY PRN
Status: DISCONTINUED | OUTPATIENT
Start: 2023-04-26 | End: 2023-04-29

## 2023-04-26 RX ORDER — LOSARTAN POTASSIUM 100 MG/1
100 TABLET ORAL EVERY MORNING
Status: DISCONTINUED | OUTPATIENT
Start: 2023-04-27 | End: 2023-04-29

## 2023-04-26 RX ORDER — NICOTINE POLACRILEX 4 MG
30 LOZENGE BUCCAL
Status: DISCONTINUED | OUTPATIENT
Start: 2023-04-26 | End: 2023-04-29

## 2023-04-26 RX ORDER — HYDROMORPHONE HYDROCHLORIDE 1 MG/ML
0.2 INJECTION, SOLUTION INTRAMUSCULAR; INTRAVENOUS; SUBCUTANEOUS EVERY 5 MIN PRN
Status: DISCONTINUED | OUTPATIENT
Start: 2023-04-26 | End: 2023-04-26 | Stop reason: HOSPADM

## 2023-04-26 RX ORDER — METOCLOPRAMIDE HYDROCHLORIDE 5 MG/ML
10 INJECTION INTRAMUSCULAR; INTRAVENOUS EVERY 8 HOURS PRN
Status: DISCONTINUED | OUTPATIENT
Start: 2023-04-26 | End: 2023-04-29

## 2023-04-26 RX ORDER — MORPHINE SULFATE 4 MG/ML
4 INJECTION, SOLUTION INTRAMUSCULAR; INTRAVENOUS EVERY 10 MIN PRN
Status: DISCONTINUED | OUTPATIENT
Start: 2023-04-26 | End: 2023-04-26 | Stop reason: HOSPADM

## 2023-04-26 RX ORDER — SODIUM PHOSPHATE, DIBASIC AND SODIUM PHOSPHATE, MONOBASIC 7; 19 G/133ML; G/133ML
1 ENEMA RECTAL ONCE AS NEEDED
Status: DISCONTINUED | OUTPATIENT
Start: 2023-04-26 | End: 2023-04-29

## 2023-04-26 RX ORDER — ROPIVACAINE HYDROCHLORIDE 5 MG/ML
INJECTION, SOLUTION EPIDURAL; INFILTRATION; PERINEURAL
Status: COMPLETED | OUTPATIENT
Start: 2023-04-26 | End: 2023-04-26

## 2023-04-26 RX ORDER — ACETAMINOPHEN 500 MG
1000 TABLET ORAL EVERY 8 HOURS
Status: DISCONTINUED | OUTPATIENT
Start: 2023-04-26 | End: 2023-04-26

## 2023-04-26 RX ORDER — DIPHENHYDRAMINE HCL 25 MG
25 CAPSULE ORAL EVERY 4 HOURS PRN
Status: DISCONTINUED | OUTPATIENT
Start: 2023-04-26 | End: 2023-04-29

## 2023-04-26 RX ORDER — FAMOTIDINE 20 MG/1
20 TABLET, FILM COATED ORAL 2 TIMES DAILY
Status: DISCONTINUED | OUTPATIENT
Start: 2023-04-26 | End: 2023-04-29

## 2023-04-26 RX ORDER — HYDROMORPHONE HYDROCHLORIDE 1 MG/ML
0.4 INJECTION, SOLUTION INTRAMUSCULAR; INTRAVENOUS; SUBCUTANEOUS EVERY 5 MIN PRN
Status: DISCONTINUED | OUTPATIENT
Start: 2023-04-26 | End: 2023-04-26 | Stop reason: HOSPADM

## 2023-04-26 RX ORDER — NALOXONE HYDROCHLORIDE 0.4 MG/ML
80 INJECTION, SOLUTION INTRAMUSCULAR; INTRAVENOUS; SUBCUTANEOUS AS NEEDED
Status: DISCONTINUED | OUTPATIENT
Start: 2023-04-26 | End: 2023-04-26 | Stop reason: HOSPADM

## 2023-04-26 RX ORDER — HYDROMORPHONE HYDROCHLORIDE 1 MG/ML
0.6 INJECTION, SOLUTION INTRAMUSCULAR; INTRAVENOUS; SUBCUTANEOUS EVERY 5 MIN PRN
Status: DISCONTINUED | OUTPATIENT
Start: 2023-04-26 | End: 2023-04-26 | Stop reason: HOSPADM

## 2023-04-26 RX ORDER — BUPIVACAINE HYDROCHLORIDE 7.5 MG/ML
INJECTION, SOLUTION INTRASPINAL AS NEEDED
Status: DISCONTINUED | OUTPATIENT
Start: 2023-04-26 | End: 2023-04-26 | Stop reason: SURG

## 2023-04-26 RX ORDER — GABAPENTIN 100 MG/1
100 CAPSULE ORAL AS NEEDED
Status: DISCONTINUED | OUTPATIENT
Start: 2023-04-26 | End: 2023-04-29

## 2023-04-26 RX ORDER — DEXTROSE MONOHYDRATE 25 G/50ML
50 INJECTION, SOLUTION INTRAVENOUS
Status: DISCONTINUED | OUTPATIENT
Start: 2023-04-26 | End: 2023-04-29

## 2023-04-26 RX ORDER — MORPHINE SULFATE 15 MG/1
15 TABLET ORAL EVERY 4 HOURS PRN
Status: ACTIVE | OUTPATIENT
Start: 2023-04-26 | End: 2023-04-27

## 2023-04-26 RX ORDER — NICOTINE POLACRILEX 4 MG
15 LOZENGE BUCCAL
Status: DISCONTINUED | OUTPATIENT
Start: 2023-04-26 | End: 2023-04-29

## 2023-04-26 RX ORDER — DIPHENHYDRAMINE HYDROCHLORIDE 50 MG/ML
12.5 INJECTION INTRAMUSCULAR; INTRAVENOUS EVERY 4 HOURS PRN
Status: DISCONTINUED | OUTPATIENT
Start: 2023-04-26 | End: 2023-04-29

## 2023-04-26 RX ORDER — ACETAMINOPHEN 500 MG
1000 TABLET ORAL EVERY 8 HOURS
Status: ACTIVE | OUTPATIENT
Start: 2023-04-26 | End: 2023-04-26

## 2023-04-26 RX ORDER — MORPHINE SULFATE 10 MG/ML
6 INJECTION, SOLUTION INTRAMUSCULAR; INTRAVENOUS EVERY 10 MIN PRN
Status: DISCONTINUED | OUTPATIENT
Start: 2023-04-26 | End: 2023-04-26 | Stop reason: HOSPADM

## 2023-04-26 RX ORDER — SENNOSIDES 8.6 MG
17.2 TABLET ORAL NIGHTLY
Status: DISCONTINUED | OUTPATIENT
Start: 2023-04-26 | End: 2023-04-29

## 2023-04-26 RX ORDER — ONDANSETRON 2 MG/ML
INJECTION INTRAMUSCULAR; INTRAVENOUS AS NEEDED
Status: DISCONTINUED | OUTPATIENT
Start: 2023-04-26 | End: 2023-04-26 | Stop reason: SURG

## 2023-04-26 RX ORDER — ACETAMINOPHEN 500 MG
1000 TABLET ORAL ONCE
Status: COMPLETED | OUTPATIENT
Start: 2023-04-26 | End: 2023-04-26

## 2023-04-26 RX ORDER — HYDROCODONE BITARTRATE AND ACETAMINOPHEN 10; 325 MG/1; MG/1
1 TABLET ORAL EVERY 6 HOURS PRN
Qty: 30 TABLET | Refills: 0 | Status: SHIPPED | OUTPATIENT
Start: 2023-04-26

## 2023-04-26 RX ORDER — MORPHINE SULFATE 4 MG/ML
4 INJECTION, SOLUTION INTRAMUSCULAR; INTRAVENOUS EVERY 2 HOUR PRN
Status: ACTIVE | OUTPATIENT
Start: 2023-04-26 | End: 2023-04-27

## 2023-04-26 RX ORDER — DOCUSATE SODIUM 100 MG/1
100 CAPSULE, LIQUID FILLED ORAL 2 TIMES DAILY
Status: DISCONTINUED | OUTPATIENT
Start: 2023-04-26 | End: 2023-04-29

## 2023-04-26 RX ORDER — OXYCODONE HYDROCHLORIDE 5 MG/1
5 TABLET ORAL EVERY 4 HOURS PRN
Status: DISPENSED | OUTPATIENT
Start: 2023-04-26 | End: 2023-04-27

## 2023-04-26 RX ORDER — OXYCODONE HYDROCHLORIDE 5 MG/1
10 TABLET ORAL EVERY 4 HOURS PRN
Status: ACTIVE | OUTPATIENT
Start: 2023-04-26 | End: 2023-04-27

## 2023-04-26 RX ORDER — DEXAMETHASONE SODIUM PHOSPHATE 10 MG/ML
INJECTION, SOLUTION INTRAMUSCULAR; INTRAVENOUS
Status: COMPLETED | OUTPATIENT
Start: 2023-04-26 | End: 2023-04-26

## 2023-04-26 RX ADMIN — DEXAMETHASONE SODIUM PHOSPHATE 10 MG: 10 INJECTION, SOLUTION INTRAMUSCULAR; INTRAVENOUS at 09:00:00

## 2023-04-26 RX ADMIN — EPHEDRINE SULFATE 10 MG: 50 INJECTION INTRAVENOUS at 11:29:00

## 2023-04-26 RX ADMIN — LIDOCAINE HYDROCHLORIDE 50 MG: 10 INJECTION, SOLUTION EPIDURAL; INFILTRATION; INTRACAUDAL; PERINEURAL at 10:15:00

## 2023-04-26 RX ADMIN — ROPIVACAINE HYDROCHLORIDE 30 ML: 5 INJECTION, SOLUTION EPIDURAL; INFILTRATION; PERINEURAL at 09:00:00

## 2023-04-26 RX ADMIN — LIDOCAINE HYDROCHLORIDE 5 ML: 10 INJECTION, SOLUTION INFILTRATION; PERINEURAL at 09:00:00

## 2023-04-26 RX ADMIN — SODIUM CHLORIDE, SODIUM LACTATE, POTASSIUM CHLORIDE, CALCIUM CHLORIDE: 600; 310; 30; 20 INJECTION, SOLUTION INTRAVENOUS at 10:06:00

## 2023-04-26 RX ADMIN — ONDANSETRON 4 MG: 2 INJECTION INTRAMUSCULAR; INTRAVENOUS at 10:06:00

## 2023-04-26 RX ADMIN — SODIUM CHLORIDE, SODIUM LACTATE, POTASSIUM CHLORIDE, CALCIUM CHLORIDE: 600; 310; 30; 20 INJECTION, SOLUTION INTRAVENOUS at 11:32:00

## 2023-04-26 RX ADMIN — EPHEDRINE SULFATE 10 MG: 50 INJECTION INTRAVENOUS at 10:20:00

## 2023-04-26 RX ADMIN — BUPIVACAINE HYDROCHLORIDE 1.5 ML: 7.5 INJECTION, SOLUTION INTRASPINAL at 10:06:00

## 2023-04-26 RX ADMIN — EPHEDRINE SULFATE 5 MG: 50 INJECTION INTRAVENOUS at 10:36:00

## 2023-04-26 NOTE — ANESTHESIA PROCEDURE NOTES
Spinal Block    Performed by: Yi Quiles CRNA  Authorized by: Julien Encarnacion MD      General Information and Staff    Start Time:   Anesthesiologist:  Julien Encarnacion MD  CRNA:  Yi Quiles CRNA  Performed by:  CRNA  Patient Location:  OR  Site identification: surface landmarks    Reason for Block: at surgeon's request, post-op pain management, procedure for pain and surgical anesthesia    Preanesthetic Checklist: patient identified, IV checked, risks and benefits discussed, monitors and equipment checked, pre-op evaluation, timeout performed, anesthesia consent and sterile technique used      Procedure Details    Patient Position:  Sitting  Prep: ChloraPrep    Location:  L3-4    Needle    Needle Type:  Pencil-tip  Needle Gauge:  25 G    Assessment    Sensory Level:   Events: clear CSF, CSF aspirated and blood negative      Additional Comments

## 2023-04-26 NOTE — PHYSICAL THERAPY NOTE
04/26/23 1713   VISIT TYPE   PT Inpatient Visit Type (Documentation Required) Attempted Evaluation  (PT order received and chart reviewed. Per discussion with RN, pt had vasovagal episode this afternoon and RN requesting to hold PT evaluation for tomorrow. Will follow up tomorrow AM as appropriate and able. )

## 2023-04-26 NOTE — HOME CARE LIAISON
This patient was set up pre-operatively with Narinder Rao. Liaison spoke with patient after surgery, all questions answered. [facility] will follow at MD for RN/PT. YARIEL Fu. aware.

## 2023-04-26 NOTE — CM/SW NOTE
CM received secure chat from [facility] liaison, pt has been referred to [facility] prior to admission by Dr. Leonarda Avalos. [facility] team to follow pt and send referral via A[employer]. F2F entered. Tsering Marks.  Regina Brannon RN, BSN  Nurse   [phone]

## 2023-04-26 NOTE — ANESTHESIA PROCEDURE NOTES
Peripheral Block    Date/Time: 4/26/2023 9:00 AM    Performed by: Kevin Denson MD  Authorized by: Kevin Denson MD      General Information and Staff    Start Time:  4/26/2023 9:00 AM  End Time:  4/26/2023 9:15 AM  Anesthesiologist:  Kevin Denson MD  Performed by: Anesthesiologist  Patient Location:  PACU    Block Placement: Pre Induction  Site Identification: real time ultrasound guided and image stored and retrievable      Reason for Block: at surgeon's request and post-op pain management    Preanesthetic Checklist: 2 patient identifers, IV checked, risks and benefits discussed, monitors and equipment checked, pre-op evaluation, timeout performed, anesthesia consent and sterile technique used      Procedure Details    Patient Position:  Supine  Prep: ChloraPrep    Monitoring:  Cardiac monitor  Block Type:  Saphenous  Injection Technique:  Single-shot    Needle    Needle Type:   Needle Localization:  Ultrasound guidance  Reason for Ultrasound Use: appropriate spread of the medication was noted in real time and no ultrasound evidence of intravascular and/or intraneural injection            Assessment    Injection Assessment:  Good spread noted, incremental injection, low pressure, local visualized surrounding nerve on ultrasound, negative aspiration for heme and no pain on injection  Paresthesia Pain:  None  Heart Rate Change: No    - Patient tolerated block procedure well without evidence of immediate block related complications.      Medications  4/26/2023 9:00 AM  fentaNYL (SUBLIMAZE) injection 50mcg/ml - Intravenous   50 mcg - 4/26/2023 9:00:00 AM  lidocaine injection 1% - Intradermal   5 mL - 4/26/2023 9:00:00 AM  ropivacaine (NAROPIN) injection 0.5% - Infiltration   30 mL - 4/26/2023 9:00:00 AM  dexamethasone (DECADRON) PF injection 10 mg/ml - Injection   10 mg - 4/26/2023 9:00:00 AM    Additional Comments

## 2023-04-26 NOTE — SIGNIFICANT EVENT
Patient up in the bathroom and felt faint. Patient placed in chair and had a syncope episode. Patient responsive immediately after, approximately 10-15 seconds. BP low, bolus started. Patient placed in a trendelenburg position. MD notified. Bolus effective BP normalized and patient awake and alert.  at bedside and kept updated regarding plan of care.

## 2023-04-26 NOTE — DISCHARGE INSTRUCTIONS
Keep dressing intact for 1 week, changing only if >50% saturated  Change dressing in 1 week if not previously changed  May shower with water-proof dressing intact  Keep leg elevated when not ambulating, no pillow directly under knee, keep leg straight with foot higher than knee. Use pain medication as provided  Use over the counter stool softener daily while taking pain medication - Colace 100mg twice a day and Senokot 17.2mg every night. If no bowel movement in 2 days, obtain Magnesium Citrate and take as directed. Start 81mg Aspirin tonight with dinner. Continue 81mg twice a day for 6 weeks. Contact office in 24 hours if you have not heard from  for RN and physical therapy home visits  Follow-up in office in 2 weeks as scheduled      Sometimes managing your health at home requires assistance. The [facility] team has recognized your preference to use [facility]. They can be reached by phone at [phone]. The fax number for your reference is [phone]. A representative from the home health agency will contact you or your family to schedule your first visit.

## 2023-04-27 PROBLEM — Z01.818 PRE-OP TESTING: Status: ACTIVE | Noted: 2023-04-27

## 2023-04-27 LAB
ANION GAP SERPL CALC-SCNC: 3 MMOL/L (ref 0–18)
BUN BLD-MCNC: 13 MG/DL (ref 7–18)
BUN/CREAT SERPL: 18.8 (ref 10–20)
CALCIUM BLD-MCNC: 8 MG/DL (ref 8.5–10.1)
CHLORIDE SERPL-SCNC: 114 MMOL/L (ref 98–112)
CO2 SERPL-SCNC: 26 MMOL/L (ref 21–32)
CREAT BLD-MCNC: 0.69 MG/DL
DEPRECATED RDW RBC AUTO: 38.8 FL (ref 35.1–46.3)
ERYTHROCYTE [DISTWIDTH] IN BLOOD BY AUTOMATED COUNT: 16.4 % (ref 11–15)
GFR SERPLBLD BASED ON 1.73 SQ M-ARVRAT: 90 ML/MIN/1.73M2 (ref 60–?)
GLUCOSE BLD-MCNC: 130 MG/DL (ref 70–99)
GLUCOSE BLDC GLUCOMTR-MCNC: 122 MG/DL (ref 70–99)
GLUCOSE BLDC GLUCOMTR-MCNC: 130 MG/DL (ref 70–99)
GLUCOSE BLDC GLUCOMTR-MCNC: 133 MG/DL (ref 70–99)
GLUCOSE BLDC GLUCOMTR-MCNC: 153 MG/DL (ref 70–99)
HCT VFR BLD AUTO: 23.8 %
HCT VFR BLD AUTO: 24.5 %
HGB BLD-MCNC: 7.2 G/DL
HGB BLD-MCNC: 7.4 G/DL
MAGNESIUM SERPL-MCNC: 1.9 MG/DL (ref 1.6–2.6)
MCH RBC QN AUTO: 20.1 PG (ref 26–34)
MCHC RBC AUTO-ENTMCNC: 30.2 G/DL (ref 31–37)
MCV RBC AUTO: 66.6 FL
OSMOLALITY SERPL CALC.SUM OF ELEC: 298 MOSM/KG (ref 275–295)
PLATELET # BLD AUTO: 157 10(3)UL (ref 150–450)
POTASSIUM SERPL-SCNC: 4.7 MMOL/L (ref 3.5–5.1)
RBC # BLD AUTO: 3.68 X10(6)UL
SODIUM SERPL-SCNC: 143 MMOL/L (ref 136–145)
WBC # BLD AUTO: 11.7 X10(3) UL (ref 4–11)

## 2023-04-27 PROCEDURE — 83735 ASSAY OF MAGNESIUM: CPT | Performed by: INTERNAL MEDICINE

## 2023-04-27 PROCEDURE — 97535 SELF CARE MNGMENT TRAINING: CPT

## 2023-04-27 PROCEDURE — 97530 THERAPEUTIC ACTIVITIES: CPT

## 2023-04-27 PROCEDURE — 36430 TRANSFUSION BLD/BLD COMPNT: CPT

## 2023-04-27 PROCEDURE — 85018 HEMOGLOBIN: CPT | Performed by: INTERNAL MEDICINE

## 2023-04-27 PROCEDURE — 30233N1 TRANSFUSION OF NONAUTOLOGOUS RED BLOOD CELLS INTO PERIPHERAL VEIN, PERCUTANEOUS APPROACH: ICD-10-PCS | Performed by: INTERNAL MEDICINE

## 2023-04-27 PROCEDURE — 82962 GLUCOSE BLOOD TEST: CPT

## 2023-04-27 PROCEDURE — 97165 OT EVAL LOW COMPLEX 30 MIN: CPT

## 2023-04-27 PROCEDURE — 97116 GAIT TRAINING THERAPY: CPT

## 2023-04-27 PROCEDURE — 97162 PT EVAL MOD COMPLEX 30 MIN: CPT

## 2023-04-27 PROCEDURE — 80048 BASIC METABOLIC PNL TOTAL CA: CPT | Performed by: INTERNAL MEDICINE

## 2023-04-27 PROCEDURE — 85060 BLOOD SMEAR INTERPRETATION: CPT | Performed by: ORTHOPAEDIC SURGERY

## 2023-04-27 PROCEDURE — 85014 HEMATOCRIT: CPT | Performed by: INTERNAL MEDICINE

## 2023-04-27 PROCEDURE — 85027 COMPLETE CBC AUTOMATED: CPT | Performed by: ORTHOPAEDIC SURGERY

## 2023-04-27 RX ORDER — SODIUM CHLORIDE 9 MG/ML
INJECTION, SOLUTION INTRAVENOUS ONCE
Status: COMPLETED | OUTPATIENT
Start: 2023-04-27 | End: 2023-04-27

## 2023-04-27 RX ORDER — HYDROCODONE BITARTRATE AND ACETAMINOPHEN 5; 325 MG/1; MG/1
1 TABLET ORAL ONCE
Status: COMPLETED | OUTPATIENT
Start: 2023-04-27 | End: 2023-04-27

## 2023-04-27 NOTE — PLAN OF CARE
Problem: PAIN - ADULT  Goal: Verbalizes/displays adequate comfort level or patient's stated pain goal  Description: INTERVENTIONS:  - Encourage pt to monitor pain and request assistance  - Assess pain using appropriate pain scale  - Administer analgesics based on type and severity of pain and evaluate response  - Implement non-pharmacological measures as appropriate and evaluate response  - Consider cultural and social influences on pain and pain management  - Manage/alleviate anxiety  - Utilize distraction and/or relaxation techniques  - Monitor for opioid side effects  - Notify MD/LIP if interventions unsuccessful or patient reports new pain  - Anticipate increased pain with activity and pre-medicate as appropriate  Outcome: Progressing     Problem: SAFETY ADULT - FALL  Goal: Free from fall injury  Description: INTERVENTIONS:  - Assess pt frequently for physical needs  - Identify cognitive and physical deficits and behaviors that affect risk of falls.   - [place] fall precautions as indicated by assessment.  - Educate pt/family on patient safety including physical limitations  - Instruct pt to call for assistance with activity based on assessment  - Modify environment to reduce risk of injury  - Provide assistive devices as appropriate  - Consider OT/PT consult to assist with strengthening/mobility  - Encourage toileting schedule  Outcome: Progressing     Problem: SKIN/TISSUE INTEGRITY - ADULT  Goal: Incision(s), wounds(s) or drain site(s) healing without S/S of infection  Description: INTERVENTIONS:  - Assess and document risk factors for pressure ulcer development  - Assess and document skin integrity  - Assess and document dressing/incision, wound bed, drain sites and surrounding tissue  - Implement wound care per orders  - Initiate isolation precautions as appropriate  - Initiate Pressure Ulcer prevention bundle as indicated  Outcome: Progressing     Problem: METABOLIC/FLUID AND ELECTROLYTES - ADULT  Goal: Glucose maintained within prescribed range  Description: INTERVENTIONS:  - Monitor Blood Glucose as ordered  - Assess for signs and symptoms of hyperglycemia and hypoglycemia  - Administer ordered medications to maintain glucose within target range  - Assess barriers to adequate nutritional intake and initiate nutrition consult as needed  - Instruct patient on self management of diabetes  Outcome: Progressing   Dressing to L knee remains clean, dry and intact. Slight edema and bruising noted to lle. Pain controled and prescribed medications and ice gel. Patient reported dizziness while ambulating in the morning. BP on the lower side. Continue IVF. Rechecked Hgb 7.2. 1 unit PRBC ordered and transfused. Patient verbalized feeling better \"more energetic and stronger\" Patient ambulates with 1 assist and a walker.    Plan: home with [facility] tomorrow

## 2023-04-27 NOTE — PHYSICAL THERAPY NOTE
Physical Therapy Contact Note    Orders received and chart reviewed. Attempted to see patient for Physical Therapy services. Patient not available secondary to patient receiving blood transfusion, per RN will not be complete for another few hours, RN reporting patient will be staying overnight. During AM session pt adequately achieving goals for D/C from a PT standpoint. 04/27/23 1522   VISIT TYPE   PT Inpatient Visit Type (Documentation Required) Attempted Treatment  (transfusion)     Will re-schedule visit.     Sheba Casey, PT, DPT  [id number]  Inpatient Rehabilitation  Physical Therapy  [phone]

## 2023-04-28 LAB
BLOOD TYPE BARCODE: 5100
GLUCOSE BLDC GLUCOMTR-MCNC: 128 MG/DL (ref 70–99)
GLUCOSE BLDC GLUCOMTR-MCNC: 138 MG/DL (ref 70–99)
GLUCOSE BLDC GLUCOMTR-MCNC: 147 MG/DL (ref 70–99)
GLUCOSE BLDC GLUCOMTR-MCNC: 175 MG/DL (ref 70–99)
HCT VFR BLD AUTO: 27 %
HGB BLD-MCNC: 8.5 G/DL
UNIT VOLUME: 350 ML

## 2023-04-28 PROCEDURE — 82962 GLUCOSE BLOOD TEST: CPT

## 2023-04-28 PROCEDURE — 85014 HEMATOCRIT: CPT | Performed by: INTERNAL MEDICINE

## 2023-04-28 PROCEDURE — 97530 THERAPEUTIC ACTIVITIES: CPT

## 2023-04-28 PROCEDURE — 85018 HEMOGLOBIN: CPT | Performed by: INTERNAL MEDICINE

## 2023-04-28 PROCEDURE — 97110 THERAPEUTIC EXERCISES: CPT

## 2023-04-28 RX ORDER — TRAMADOL HYDROCHLORIDE 50 MG/1
50 TABLET ORAL EVERY 6 HOURS PRN
Status: DISCONTINUED | OUTPATIENT
Start: 2023-04-28 | End: 2023-04-29

## 2023-04-28 RX ORDER — HYDROCODONE BITARTRATE AND ACETAMINOPHEN 10; 325 MG/1; MG/1
1 TABLET ORAL EVERY 4 HOURS PRN
Status: DISCONTINUED | OUTPATIENT
Start: 2023-04-28 | End: 2023-04-29

## 2023-04-28 NOTE — CDS QUERY
Dr. Melissa Wilson: To answer this query: DON'T [id number] [facility], 1st click on 3 dots to the RIGHT OF CO-SIGN Button AND CLICK EDIT.    2nd type an \"X\" in the bracket for the diagnosis that applies. (You may type in any additional clinical details you feel necessary to substantiate your response). 3rd Then Click on the Sign Button to complete your response. Thank you. .Clinical Significance - Lab Results Associated with Blood Loss  CLINICAL DOCUMENTATION CLARIFICATION FORM  Dear Doctor Melissa Wilson,   Clinical information (provided below) indicates blood loss and abnormal blood counts. For accurate ICD-10-CM code assignment to reflect severity of illness and risk of mortality,  PLEASE (X) ALL DIAGNOSES THAT APPLY. SELECTION BY PROVIDER ONLY   (   )  Postoperative Anemia due to Acute Blood Loss   ( x  )  Acute Blood Loss Anemia   (   )  Other  Explanation,  (please specify):_____________________________________        1  Documentation from the Medical Record     04/26/23 78 yr old female with  past medical history HTN, HLD, Hypothyroid, Thalassemia, DM, GERD, breast cancer, who presented for left knee replacement. 04/26/23 PROCEDURE: Robotic assisted left total knee arthroplasty. Blood Loss: Approximately 100 mL.    04/26/23 6:27 PM Significant Event Nurse Nurse Note: Patient up in the bathroom and felt faint. Patient placed in chair and had a syncope episode. Patient responsive immediately after, approximately 10-15 seconds. BP low, bolus started. Patient placed in a trendelenburg position. MD notified. Bolus effective BP normalized and patient awake and alert.     04/26/23 PM BP's 79/50 x 2 then 131/58 139/58    04/27/23 H/H: 7.4/24.5 Repeat H/H: 7.2/23.8    04/27/23 [facility] Hospitalist Progress Note: Orthostatic hypotension  Symptomatic anemia  - x2 working with PT  - Fluids continued  - Postop anemia with history of thalassemia  - will transfuse 1 unit PRBC    Treatment: 4/27 H/H checked x 2, 4/27 1 unit PRBC [facility], 4/28 H/H 8.5/27.0    ______________________________________________________________________________________________________  If you have any questions, please contact Clinical :  Claudette Wood RN at [phone]     Thank You.     THIS FORM IS A PERMANENT PART OF THE MEDICAL RECORD

## 2023-04-28 NOTE — PLAN OF CARE
Problem: PAIN - ADULT  Goal: Verbalizes/displays adequate comfort level or patient's stated pain goal  Description: INTERVENTIONS:  - Encourage pt to monitor pain and request assistance  - Assess pain using appropriate pain scale  - Administer analgesics based on type and severity of pain and evaluate response  - Implement non-pharmacological measures as appropriate and evaluate response  - Consider cultural and social influences on pain and pain management  - Manage/alleviate anxiety  - Utilize distraction and/or relaxation techniques  - Monitor for opioid side effects  - Notify MD/LIP if interventions unsuccessful or patient reports new pain  - Anticipate increased pain with activity and pre-medicate as appropriate  Outcome: Progressing     Problem: SAFETY ADULT - FALL  Goal: Free from fall injury  Description: INTERVENTIONS:  - Assess pt frequently for physical needs  - Identify cognitive and physical deficits and behaviors that affect risk of falls.   - [place] fall precautions as indicated by assessment.  - Educate pt/family on patient safety including physical limitations  - Instruct pt to call for assistance with activity based on assessment  - Modify environment to reduce risk of injury  - Provide assistive devices as appropriate  - Consider OT/PT consult to assist with strengthening/mobility  - Encourage toileting schedule  Outcome: Progressing     Problem: DISCHARGE PLANNING  Goal: Discharge to home or other facility with appropriate resources  Description: INTERVENTIONS:  - Identify barriers to discharge w/pt and caregiver  - Include patient/family/discharge partner in discharge planning  - Arrange for needed discharge resources and transportation as appropriate  - Identify discharge learning needs (meds, wound care, etc)  - Arrange for interpreters to assist at discharge as needed  - Consider post-discharge preferences of patient/family/discharge partner  - Complete POLST form as appropriate  - Assess patient's ability to be responsible for managing their own health  - Refer to Case Management Department for coordinating discharge planning if the patient needs post-hospital services based on physician/LIP order or complex needs related to functional status, cognitive ability or social support system  Outcome: Progressing   Hgb stable today. Dizziness improved. Patient c/o severe pain/swelling and stiffness to LLE. Unable to flex her knee. Pain medications adjusted. Pain down to 5/10 with activity. Left leg flexion improved. Patient ambulates with 1 assist and a walker. Needs PT clearance prior dc.    Plan: Home with [facility] tomorrow

## 2023-04-28 NOTE — PLAN OF CARE
Patient with reports of increased stiffness to LLE along with pain. Norco given PRN & gel ice packs to site for comfort. Encouraging ambulation and movement. Pt reports some continued spells of dizziness when getting up in the bathroom this morning; IVF were resumed. Dressing remains C/D/I. SCDs. Daughter at bedside overnight for comforting support.      Problem: Patient Centered Care  Goal: Patient preferences are identified and integrated in the patient's plan of care  Description: Interventions:  - Provide timely, complete, and accurate information to patient/family  - Incorporate patient and family knowledge, values, beliefs, and cultural backgrounds into the planning and delivery of care  - Encourage patient/family to participate in care and decision-making at the level they choose  - Honor patient and family perspectives and choices  Outcome: Progressing     Problem: Diabetes/Glucose Control  Goal: Glucose maintained within prescribed range  Description: INTERVENTIONS:  - Monitor Blood Glucose as ordered  - Assess for signs and symptoms of hyperglycemia and hypoglycemia  - Administer ordered medications to maintain glucose within target range  - Assess barriers to adequate nutritional intake and initiate nutrition consult as needed  - Instruct patient on self management of diabetes  Outcome: Progressing     Problem: PAIN - ADULT  Goal: Verbalizes/displays adequate comfort level or patient's stated pain goal  Description: INTERVENTIONS:  - Encourage pt to monitor pain and request assistance  - Assess pain using appropriate pain scale  - Administer analgesics based on type and severity of pain and evaluate response  - Implement non-pharmacological measures as appropriate and evaluate response  - Consider cultural and social influences on pain and pain management  - Manage/alleviate anxiety  - Utilize distraction and/or relaxation techniques  - Monitor for opioid side effects  - Notify MD/LIP if interventions unsuccessful or patient reports new pain  - Anticipate increased pain with activity and pre-medicate as appropriate  Outcome: Progressing     Problem: SAFETY ADULT - FALL  Goal: Free from fall injury  Description: INTERVENTIONS:  - Assess pt frequently for physical needs  - Identify cognitive and physical deficits and behaviors that affect risk of falls.   - [place] fall precautions as indicated by assessment.  - Educate pt/family on patient safety including physical limitations  - Instruct pt to call for assistance with activity based on assessment  - Modify environment to reduce risk of injury  - Provide assistive devices as appropriate  - Consider OT/PT consult to assist with strengthening/mobility  - Encourage toileting schedule  Outcome: Progressing     Problem: DISCHARGE PLANNING  Goal: Discharge to home or other facility with appropriate resources  Description: INTERVENTIONS:  - Identify barriers to discharge w/pt and caregiver  - Include patient/family/discharge partner in discharge planning  - Arrange for needed discharge resources and transportation as appropriate  - Identify discharge learning needs (meds, wound care, etc)  - Arrange for interpreters to assist at discharge as needed  - Consider post-discharge preferences of patient/family/discharge partner  - Complete POLST form as appropriate  - Assess patient's ability to be responsible for managing their own health  - Refer to Case Management Department for coordinating discharge planning if the patient needs post-hospital services based on physician/LIP order or complex needs related to functional status, cognitive ability or social support system  Outcome: Progressing     Problem: METABOLIC/FLUID AND ELECTROLYTES - ADULT  Goal: Glucose maintained within prescribed range  Description: INTERVENTIONS:  - Monitor Blood Glucose as ordered  - Assess for signs and symptoms of hyperglycemia and hypoglycemia  - Administer ordered medications to maintain glucose within target range  - Assess barriers to adequate nutritional intake and initiate nutrition consult as needed  - Instruct patient on self management of diabetes  Outcome: Progressing     Problem: SKIN/TISSUE INTEGRITY - ADULT  Goal: Incision(s), wounds(s) or drain site(s) healing without S/S of infection  Description: INTERVENTIONS:  - Assess and document risk factors for pressure ulcer development  - Assess and document skin integrity  - Assess and document dressing/incision, wound bed, drain sites and surrounding tissue  - Implement wound care per orders  - Initiate isolation precautions as appropriate  - Initiate Pressure Ulcer prevention bundle as indicated  Outcome: Progressing     Problem: HEMATOLOGIC - ADULT  Goal: Maintains hematologic stability  Description: INTERVENTIONS  - Assess for signs and symptoms of bleeding or hemorrhage  - Monitor labs and vital signs for trends  - Administer supportive blood products/factors, fluids and medications as ordered and appropriate  - Administer supportive blood products/factors as ordered and appropriate  Outcome: Progressing     Problem: MUSCULOSKELETAL - ADULT  Goal: Return mobility to safest level of function  Description: INTERVENTIONS:  - Assess patient stability and activity tolerance for standing, transferring and ambulating w/ or w/o assistive devices  - Assist with transfers and ambulation using safe patient handling equipment as needed  - Ensure adequate protection for wounds/incisions during mobilization  - Obtain PT/OT consults as needed  - Advance activity as appropriate  - Communicate ordered activity level and limitations with patient/family  Outcome: Progressing

## 2023-04-28 NOTE — PROGRESS NOTES
04/28/23 3186   VISIT TYPE   OT Inpatient Visit Type (Documentation Required) Attempted Treatment     Chart reviewed. Per discussion with RN, patient nauseas and requesting to lay in bed. RN requesting therapy return at a later time. Will plan to follow up as schedule permits. Thank you.      Ginnie Essex, OTR/L  [facility]  PRN - Staff

## 2023-04-29 VITALS
DIASTOLIC BLOOD PRESSURE: 51 MMHG | HEART RATE: 80 BPM | RESPIRATION RATE: 16 BRPM | BODY MASS INDEX: 28.51 KG/M2 | TEMPERATURE: 98 F | WEIGHT: 151 LBS | HEIGHT: 61 IN | SYSTOLIC BLOOD PRESSURE: 116 MMHG | OXYGEN SATURATION: 96 %

## 2023-04-29 LAB
GLUCOSE BLDC GLUCOMTR-MCNC: 146 MG/DL (ref 70–99)
HCT VFR BLD AUTO: 25.4 %
HGB BLD-MCNC: 8 G/DL

## 2023-04-29 PROCEDURE — 97530 THERAPEUTIC ACTIVITIES: CPT

## 2023-04-29 PROCEDURE — 82962 GLUCOSE BLOOD TEST: CPT

## 2023-04-29 PROCEDURE — 85014 HEMATOCRIT: CPT | Performed by: INTERNAL MEDICINE

## 2023-04-29 PROCEDURE — 85018 HEMOGLOBIN: CPT | Performed by: INTERNAL MEDICINE

## 2023-04-29 RX ORDER — ONDANSETRON 4 MG/1
4 TABLET, ORALLY DISINTEGRATING ORAL EVERY 8 HOURS PRN
Qty: 15 TABLET | Refills: 0 | Status: SHIPPED | OUTPATIENT
Start: 2023-04-29

## 2023-04-29 NOTE — CM/SW NOTE
DC today, per RN. HANSA notified Kay[facility] liaison. PLAN: home 4/29 w [facility]    SW remains available for support and/or discharge planning. Please do not hesitate to call/chat SW if further DC needs arise.      Juan PARRA, [place], [place]   Ext [phone]

## 2023-04-29 NOTE — PHYSICAL THERAPY NOTE
PHYSICAL THERAPY TREATMENT NOTE - INPATIENT     Room Number: Room 2/Room 2-A       Presenting Problem: s/p L TKA 4/26/23    Problem List  Active Problems:    S/P total knee arthroplasty, left    Pre-op testing      PHYSICAL THERAPY ASSESSMENT   Chart reviewed. RN Kimberly Love approved participation in physical therapy. PPE worn by therapist: mask and gloves. Patient was wearing a mask during session. Patient presented in bathroom wt RN with 6/10 pain. Patient with fair  progress towards goals during this session. Education provided on Total knee exercise protocol, Physical therapy plan of care and physiological benefits of out of bed mobility. Patient with good carryover. Pt is received in the bathroom with RN present. Pt is CGA with sit<>stand transfers and with AMB. Pt was able to AMB about 79' with the  CGA. Pt with slow gait and decreased step length with narrow MARY LOU but with good balance and safety awareness. Pt was educated on the importance of moving as much as possible when home and with good understanding. Pt declined stair negotiation this session. Pt did perform a couple of days ago during therapy. Verbally reviewed with pt and spouse with good understanding. Pt reported that she was feeling a little nauseous with activity. Pt is a little nervous and anxious with activity. Returned pt back to the room and to sitting in the chair with all needs within reach. Pt is on track to dc to home once medically cleared. Reported to the RN on the status of the pt. Bed mobility: NT  Transfers: Contact guard assist  Gait Assistance: Contact guard assist  Distance (ft): 70'  Assistive Device: Rolling walker  Pattern: L Decreased stance time          Patient was left in bedside chair at end of session with all needs in reach. The patient's Approx Degree of Impairment: 46.58% has been calculated based on documentation in the [facility] '6 clicks' Inpatient Basic Mobility Short Form.   Research supports that patients with this level of impairment may benefit from Home with home health PT. RN aware of patient status post session. DISCHARGE RECOMMENDATIONS  PT Discharge Recommendations: Home with home health PT;24 hour care/supervision     PLAN  PT Treatment Plan: Bed mobility; Body mechanics; Coordination; Endurance; Energy conservation;Patient education;Gait training;Range of motion;Strengthening;Stoop training;Stair training;Transfer training;Balance training    SUBJECTIVE  Pt was agreeable to therapy session. OBJECTIVE  Precautions: Limb alert - right    WEIGHT BEARING RESTRICTION  Weight Bearing Restriction: L lower extremity           L Lower Extremity: Weight Bearing as Tolerated    PAIN ASSESSMENT   Ratin  Location: L knee  Management Techniques: Activity promotion; Body mechanics; Relaxation;Repositioning    BALANCE                                                                                                                       Static Sitting: Fair +  Dynamic Sitting: Fair           Static Standing: Fair -  Dynamic Standing: Fair -    ACTIVITY TOLERANCE                         O2 WALK       AM-PAC '6-Clicks' INPATIENT SHORT FORM - BASIC MOBILITY  How much difficulty does the patient currently have. .. Patient Difficulty: Turning over in bed (including adjusting bedclothes, sheets and blankets)?: A Little   Patient Difficulty: Sitting down on and standing up from a chair with arms (e.g., wheelchair, bedside commode, etc.): A Little   Patient Difficulty: Moving from lying on back to sitting on the side of the bed?: A Little   How much help from another person does the patient currently need. ..    Help from Another: Moving to and from a bed to a chair (including a wheelchair)?: A Little   Help from Another: Need to walk in hospital room?: A Little   Help from Another: Climbing 3-5 steps with a railing?: A Little     AM-PAC Score:  Raw Score: 18   Approx Degree of Impairment: 46.58%   Standardized Score (AM-PAC Scale): 43.63 CMS Modifier (G-Code): CK        Patient End of Session: Up in chair;Needs met;Call light within reach;RN aware of session/findings; All patient questions and concerns addressed; Family present    CURRENT GOALS   Goals to be met by: 4/29/23  Patient Goal Patient's self-stated goal is: return home safely   Goal #1 Patient is able to demonstrate supine - sit EOB @ level: modified independent     Goal #1   Current Status NT received in the bathroom with RN   Goal #2 Patient is able to demonstrate transfers Sit to/from Stand at assistance level: modified independent     Goal #2  Current Status CGA with RW   Goal #3 Patient is able to ambulate 150 feet with assistive device at assistance level: modified independent    Goal #3   Current Status Ambulated 70 ft with RW and CGA   Goal #4 Patient will negotiate 10 stairs/one curb w/ assistive device and supervision   Goal #4   Current Status NT Pt declined this session verbally reviewed    Goal #5  AROM 0 degrees extension to 95 degrees flexion     Goal #5   Current Status IN PROGRESS   Goal #6 Patient independently performs home exercise program for ROM/strengthening per the instructions provided in preparation for discharge.    Goal #6  Current Status IN PROGRESS     PT Session Time: 15 minutes    Therapeutic Activity: 15 minutes

## 2023-04-29 NOTE — PLAN OF CARE
Problem: Patient Centered Care  Goal: Patient preferences are identified and integrated in the patient's plan of care  Description: Interventions:  - Provide timely, complete, and accurate information to patient/family  - Incorporate patient and family knowledge, values, beliefs, and cultural backgrounds into the planning and delivery of care  - Encourage patient/family to participate in care and decision-making at the level they choose  - Honor patient and family perspectives and choices  Outcome: Progressing     Problem: Diabetes/Glucose Control  Goal: Glucose maintained within prescribed range  Description: INTERVENTIONS:  - Monitor Blood Glucose as ordered  - Assess for signs and symptoms of hyperglycemia and hypoglycemia  - Administer ordered medications to maintain glucose within target range  - Assess barriers to adequate nutritional intake and initiate nutrition consult as needed  - Instruct patient on self management of diabetes  Outcome: Progressing     Problem: PAIN - ADULT  Goal: Verbalizes/displays adequate comfort level or patient's stated pain goal  Description: INTERVENTIONS:  - Encourage pt to monitor pain and request assistance  - Assess pain using appropriate pain scale  - Administer analgesics based on type and severity of pain and evaluate response  - Implement non-pharmacological measures as appropriate and evaluate response  - Consider cultural and social influences on pain and pain management  - Manage/alleviate anxiety  - Utilize distraction and/or relaxation techniques  - Monitor for opioid side effects  - Notify MD/LIP if interventions unsuccessful or patient reports new pain  - Anticipate increased pain with activity and pre-medicate as appropriate  Outcome: Progressing     Problem: SAFETY ADULT - FALL  Goal: Free from fall injury  Description: INTERVENTIONS:  - Assess pt frequently for physical needs  - Identify cognitive and physical deficits and behaviors that affect risk of falls.  - Independence fall precautions as indicated by assessment.  - Educate pt/family on patient safety including physical limitations  - Instruct pt to call for assistance with activity based on assessment  - Modify environment to reduce risk of injury  - Provide assistive devices as appropriate  - Consider OT/PT consult to assist with strengthening/mobility  - Encourage toileting schedule  Outcome: Progressing     Problem: DISCHARGE PLANNING  Goal: Discharge to home or other facility with appropriate resources  Description: INTERVENTIONS:  - Identify barriers to discharge w/pt and caregiver  - Include patient/family/discharge partner in discharge planning  - Arrange for needed discharge resources and transportation as appropriate  - Identify discharge learning needs (meds, wound care, etc)  - Arrange for interpreters to assist at discharge as needed  - Consider post-discharge preferences of patient/family/discharge partner  - Complete POLST form as appropriate  - Assess patient's ability to be responsible for managing their own health  - Refer to Case Management Department for coordinating discharge planning if the patient needs post-hospital services based on physician/LIP order or complex needs related to functional status, cognitive ability or social support system  Outcome: Progressing     Problem: METABOLIC/FLUID AND ELECTROLYTES - ADULT  Goal: Glucose maintained within prescribed range  Description: INTERVENTIONS:  - Monitor Blood Glucose as ordered  - Assess for signs and symptoms of hyperglycemia and hypoglycemia  - Administer ordered medications to maintain glucose within target range  - Assess barriers to adequate nutritional intake and initiate nutrition consult as needed  - Instruct patient on self management of diabetes  Outcome: Progressing     Problem: SKIN/TISSUE INTEGRITY - ADULT  Goal: Incision(s), wounds(s) or drain site(s) healing without S/S of infection  Description: INTERVENTIONS:  - Assess and document risk factors for pressure ulcer development  - Assess and document skin integrity  - Assess and document dressing/incision, wound bed, drain sites and surrounding tissue  - Implement wound care per orders  - Initiate isolation precautions as appropriate  - Initiate Pressure Ulcer prevention bundle as indicated  Outcome: Progressing     Problem: HEMATOLOGIC - ADULT  Goal: Maintains hematologic stability  Description: INTERVENTIONS  - Assess for signs and symptoms of bleeding or hemorrhage  - Monitor labs and vital signs for trends  - Administer supportive blood products/factors, fluids and medications as ordered and appropriate  - Administer supportive blood products/factors as ordered and appropriate  Outcome: Progressing     Problem: MUSCULOSKELETAL - ADULT  Goal: Return mobility to safest level of function  Description: INTERVENTIONS:  - Assess patient stability and activity tolerance for standing, transferring and ambulating w/ or w/o assistive devices  - Assist with transfers and ambulation using safe patient handling equipment as needed  - Ensure adequate protection for wounds/incisions during mobilization  - Obtain PT/OT consults as needed  - Advance activity as appropriate  - Communicate ordered activity level and limitations with patient/family  Outcome: Progressing     Pain improved with adjusted pain medication. Tramadol and Norco given PRN. Up with x1 assist and walker. Tolerating diet. Voiding. Plans to work again with PT today prior to hopeful discharge home today.

## (undated) DEVICE — DRAPE SHEET LARGE 76X55

## (undated) DEVICE — T5 HOOD WITH PEEL AWAY FACE SHIELD

## (undated) DEVICE — PADDING 4YDX6IN CTTN STRL WBRL

## (undated) DEVICE — BLADE SAW DEPUY

## (undated) DEVICE — CHLORAPREP 26ML APPLICATOR

## (undated) DEVICE — GLOVE ORTHO ALOETOUCH SZ 6-1/2

## (undated) DEVICE — DRAIN BLAKE ROUND 15FR

## (undated) DEVICE — SOL  .9 1000ML BTL

## (undated) DEVICE — 25MM TIBIAL FEMALE HEX

## (undated) DEVICE — LAPAROTOMY SPONGE - RF AND X-RAY DETECTABLE PRE-WASHED: Brand: SITUATE

## (undated) DEVICE — STERILE (15.2 X 244CM) POLYETHYLENE TELESCOPICALLY-FOLDED COVER WITH ATTACHED (3CM) NEOGUARD™ TIP: Brand: SURGI-TIP TRANSDUCER COVER

## (undated) DEVICE — BLADE ELECTRODE: Brand: EDGE

## (undated) DEVICE — SUT VICRYL 2-0 FS-1 J443H

## (undated) DEVICE — PROXIMATE SKIN STAPLERS (35 WIDE) CONTAINS 35 STAINLESS STEEL STAPLES (FIXED HEAD): Brand: PROXIMATE

## (undated) DEVICE — BATTERY

## (undated) DEVICE — ENCORE® LATEX MICRO SIZE 7.5, STERILE LATEX POWDER-FREE SURGICAL GLOVE: Brand: ENCORE

## (undated) DEVICE — KENDALL SCD EXPRESS SLEEVES, KNEE LENGTH, MEDIUM: Brand: KENDALL SCD

## (undated) DEVICE — CSTM UNIVERSAL DRAPE PK: Brand: MEDLINE INDUSTRIES, INC.

## (undated) DEVICE — SUTURE VICRYL 0 CP-1

## (undated) DEVICE — SOL  .9 3000ML

## (undated) DEVICE — PIN FIX 80MM DIA3.2MM FLUTED

## (undated) DEVICE — POLAR CARE CUBE COOLING UNIT

## (undated) DEVICE — HOOD: Brand: FLYTE

## (undated) DEVICE — DRESSING 10X4IN ANMC SAFETAC

## (undated) DEVICE — STERILE LATEX POWDER-FREE SURGICAL GLOVESWITH NITRILE COATING: Brand: PROTEXIS

## (undated) DEVICE — DUAL CUT SAGITTAL BLADE

## (undated) DEVICE — SOL NACL IRRIG 0.9% 1000ML BTL

## (undated) DEVICE — PLASTC TOOMEY SYRNG DISP

## (undated) DEVICE — BOWL CEMENT MIX QUICK-VAC

## (undated) DEVICE — UNDERPAD INCNT 30X30IN PP HVY

## (undated) DEVICE — CHLORAPREP ORANGE TINT 10.5ML

## (undated) DEVICE — Device: Brand: PLUMEPEN

## (undated) DEVICE — 60 ML SYRINGE LUER-LOCK TIP: Brand: MONOJECT

## (undated) DEVICE — TROCAR

## (undated) DEVICE — PAD SACRAL SPAN AID

## (undated) DEVICE — TOTAL KNEE: Brand: MEDLINE INDUSTRIES, INC.

## (undated) DEVICE — TOWEL SURG OR 17X30IN BLUE

## (undated) DEVICE — 3M™ STERI-DRAPE™ INSTRUMENT POUCH 1018: Brand: STERI-DRAPE™

## (undated) DEVICE — SUTURE SILK 0 FSL

## (undated) DEVICE — SOLUTION  .9 3000ML

## (undated) DEVICE — 2.5 MM FEMALE HEX SCREW

## (undated) DEVICE — SUTURE ETHIBOND 1 CT-1

## (undated) DEVICE — HEMOCLIP HORIZON MED 002200

## (undated) DEVICE — DRAPE HALF 40X58 DYNJP2410

## (undated) DEVICE — NEEDLE SPINAL 20X3-1/2 YELLOW

## (undated) DEVICE — CURAD OIL EMLUSION GAUZE 3X16

## (undated) DEVICE — PROXIMATE RH ROTATING HEAD SKIN STAPLERS (35 WIDE) CONTAINS 35 STAINLESS STEEL STAPLES: Brand: PROXIMATE

## (undated) DEVICE — 3M™ IOBAN™ 2 ANTIMICROBIAL INCISE DRAPE 6648EZ: Brand: IOBAN™ 2

## (undated) DEVICE — PLASTIC BREAST CDS-LF: Brand: MEDLINE INDUSTRIES, INC.

## (undated) DEVICE — BRA ZIPPERED STYLE 958 LARGE

## (undated) DEVICE — SUT ETHIBOND 1 CT-1 X425H

## (undated) DEVICE — VIAL LABORATORY SPY

## (undated) DEVICE — GAUZE SPONGES,12 PLY: Brand: CURITY

## (undated) DEVICE — DRAPE SRG 70X60IN SPLT U IMPRV

## (undated) DEVICE — PIN FIXATION 3.2X150MM FLUTED

## (undated) DEVICE — Device

## (undated) DEVICE — PAD THRP 16IN WRPON MU LNG STM

## (undated) DEVICE — CONTAINER SPEC STR 4OZ GRY LID

## (undated) DEVICE — SUTURE SILK 0

## (undated) DEVICE — TRAY FOLEY BDX 16F STATLOCK

## (undated) DEVICE — GAMMEX® PI HYBRID SIZE 7, STERILE POWDER-FREE SURGICAL GLOVE, POLYISOPRENE AND NEOPRENE BLEND: Brand: GAMMEX

## (undated) DEVICE — STERILE POLYISOPRENE POWDER-FREE SURGICAL GLOVES: Brand: PROTEXIS

## (undated) DEVICE — DRAPE ROBOTIC UNIT DISP ROSA

## (undated) DEVICE — BANDAGE ROLL,100% COTTON, 6 PLY, LARGE: Brand: KERLIX

## (undated) DEVICE — GOWN,SIRUS,FABRIC-REINFORCED,LARGE: Brand: MEDLINE

## (undated) DEVICE — DRESSING AQUACEL AG SP 3.5X10

## (undated) DEVICE — BLADE SAW SAGITTAL 19.5

## (undated) DEVICE — BNDG COHESIVE W4INXL5YD TAN E

## (undated) DEVICE — DRAPE SHEET LG

## (undated) DEVICE — SURETRANS AUTOTRANSFUSION SYSTEM FOR ORTHOPAEDICS WITH PVC DRAIN AND 2 TROCARS: Brand: SURETRANS AUTOTRANSFUSION SYSTEM FOR ORTHOPAEDICS

## (undated) DEVICE — SUTURE VICRYL 2-0 CT-1

## (undated) DEVICE — STERILE TETRA-FLEX CF, ELASTIC BANDAGE, 4" X 5.5YD: Brand: TETRA-FLEX™CF

## (undated) DEVICE — ZIMMER® STERILE DISPOSABLE TOURNIQUET CUFF WITH PLC, DUAL PORT, SINGLE BLADDER, 30 IN. (76 CM)

## (undated) DEVICE — SUTURE VLOC 90 3-0 23\" 0034

## (undated) DEVICE — DRAIN RELIAVAC 100CC

## (undated) DEVICE — 3M™ IOBAN™ 2 ANTIMICROBIAL INCISE DRAPE 6651EZ: Brand: IOBAN™ 2

## (undated) DEVICE — GLOVE SURG TRIUMPH SZ 6

## (undated) DEVICE — WRAP COOLING KNEE W/ICE PILLOW

## (undated) DEVICE — APPLICATOR CHLORAPREP 26ML

## (undated) DEVICE — KIT NAVIGATION KNEE NIVITRACK

## (undated) DEVICE — SPINOCAN® 18 GA. X 3-1/2 IN. (90 MM) SPINAL NEEDLE: Brand: SPINOCAN®

## (undated) DEVICE — ELECTRODE BALL 5MM DBL-511

## (undated) DEVICE — 48MM

## (undated) DEVICE — GOWN,SIRUS,FABRIC-REINFORCED,X-LARGE: Brand: MEDLINE

## (undated) DEVICE — 25MM FEMALE SCREW-Z

## (undated) DEVICE — 50MM MG 2.5 HEX HEAD PIN-DJ

## (undated) DEVICE — 75MM TROCAR SMOOTH PIN-D

## (undated) DEVICE — INTENDED FOR TISSUE SEPARATION, AND OTHER PROCEDURES THAT REQUIRE A SHARP SURGICAL BLADE TO PUNCTURE OR CUT.: Brand: BARD-PARKER ® STAINLESS STEEL BLADES

## (undated) NOTE — LETTER
[facility] Testing Department  Phone: [phone]  Right Fax: [phone]  [facility] 20 By: NEELA Nuñez RN Date: 5/11/18    Patient Name: Randolph Coast  Surgery Date: 5/17/2018    CSN: [id number]  Medical Record: [id number]   DO

## (undated) NOTE — IP AVS SNAPSHOT
[facility]            (For Outpatient Use Only) Initial Admit Date: 10/25/2018   Inpt/Obs Admit Date: Inpt: 10/25/18 / Obs: N/A   Discharge Date:    Adonay Merino:  [de-identified]   MRN: [de-identified]   CSN: [id number]        ENCOUNTER  [facility] Account Financial Class: Medicare Advantage    October 29, 2018

## (undated) NOTE — IP AVS SNAPSHOT
Patient Demographics     Address  [address] [place] [address] Phone  [phone] (Home) *Preferred*  [phone] [facility]) E-mail Address  [email or web address]      Emergency Contact(s)     Name Relation Home Work Mobile    Mike Fernandez [employer] D3 5000 units Caps  Generic drug:  cholecalciferol  Next dose due:  TOMORROW MORNING      Take 1 capsule by mouth daily. docusate sodium 100 MG Caps  Commonly known as:  COLACE      Take 100 mg by mouth 2 (two) times daily.    Hira Gooden Take 1 tablet (17.2 mg total) by mouth nightly. Steffen Elaine NP         Vitamin B-12 1000 MCG Tabs  Commonly known as:  VITAMIN B12  Next dose due:  TOMORROW MORNING      Take 1,000 mcg by mouth daily.                 Where to Get Your Medications Patient's Most Recent Weight       Most Recent Value   Patient Weight  71.1 kg (156 lb 11.2 oz)         Lab Results Last 24 Hours      CBC, PLATELET; NO DIFFERENTIAL [[id number]] (Abnormal)  Resulted: 10/29/18 0501, Result status: Final result   Ordering pr -IV/PO prn pain meds.   Monitor mental status and vitals closely  -expected acute blood loss anemia, preop hemoglobin per outpatient chart review 11.3  -krueger  -Bowel reg, antiemetics  -DVT Prophy- ASA  -PT/OT/SW[GB.1]-HHC vs rehab[GB.2]  -as per surgery GENITAL/:[GB.1] krueger[GB.2]  EXTREMITIES:[GB.1] right leg with dressing[GB. 2]     PMH[GB.1]  Past Medical History:   Diagnosis Date   • Anemia    • Cancer [facility]) 2018    right breast mastectomy   • Diabetes ([facility] [address]    • Disorder of thyroid    • Esophageal r [DISCONTINUED] LOSARTAN POTASSIUM 50 MG Oral Tab TAKE 1 TABLET (50 MG TOTAL) BY MOUTH DAILY.  Disp: 90 tablet Rfl: 2   [DISCONTINUED] Levothyroxine Sodium 100 MCG Oral Tab Take 100 mcg by mouth before breakfast. Disp:  Rfl:    gabapentin 300 MG Oral [facility] Dictated by (CST): Darrell Peralta MD on 10/25/2018 at 15:09     Approved by (CST): Darrell Peralta MD on 10/25/2018 at 15:09[GB. 3]              SEE ATTENDING NOTE BELOW[GB.1]      Patient examined and assessed independently.  Agree with above APN asse GV.2 - Lashonda Montes MD on 10/25/2018  4:19 PM               H&P signed by Leo Lee NP at 10/25/2018  3:24 PM  Version 1 of 2    Author:  Leo Lee NP Service:  Hospitalist Author Type:  Nurse Practitioner    Filed:  10/25/2018  3:24 PM cancer S/[GB. 1]P[GB.2] Right Mastectomy and OA who presents for knee surgery[GB.1]. Pt is S/P Right Knee Replacemen    Denies pain in right leg, leg still numb, able to move toes.  No CP, SOB, abdominal pain, N/V.[GB.2]     OBJECTIVE:[GB.1]  /56   Pul as needed for Pain (Pre-op). Disp:  Rfl:    MetFORMIN HCl  MG Oral Tablet 24 Hr TAKE 1 TABLET DAILY WITH BREAKFAST. Disp: 90 tablet Rfl: 2   Losartan Potassium 50 MG Oral Tab Take 1 tablet (50 mg total) by mouth daily.  Disp: 90 tablet Rfl: 2   Levoth A comprehensive 10 point review of systems was completed. Pertinent positives and negatives noted in the the HPI. DIAGNOSTIC DATA:   CBC/Chem[GB. 1]  No results for input(s): WBC, HGB, MCV, PLT, BAND, INR in the last 168 hours.     Invalid input(s): LYM#, Primary osteoarthritis of right knee      Past Medical History   Past Medical History:   Diagnosis Date   • Anemia    • Cancer ([facility] [address] 2018    right breast mastectomy   • Diabetes ([facility] [address]    • Disorder of thyroid    • Esophageal reflux    • High blood pressu -   Moving from lying on back to sitting on the side of the bed?: A Little   How much help from another person does the patient currently need. ..   -   Moving to and from a bed to a chair (including a wheelchair)?: A Little   -   Need to walk in hospital r assistance level: modified independent      Goal #2  Current Status  Min a with RW   Goal #3 Patient is able to ambulate 300 feet with assistive device at assistance level: SBA   Goal #3   Current Status  Pt amb 2 x 100 ft with RW and min aFamily present;f Past Medical History:   Diagnosis Date   • Anemia    • Cancer ([facility] [address] 2018    right breast mastectomy   • Diabetes ([facility]    • Disorder of thyroid    • Esophageal reflux    • High blood pressure    • HTN (hypertension)    • Hypothyroid    • IFG (impaired fast How much help from another person does the patient currently need. ..   -   Moving to and from a bed to a chair (including a wheelchair)?: A Little   -   Need to walk in hospital room?: A Little   -   Climbing 3-5 steps with a railing?: A Little       AM-PA impairments and functional limitations. HEP was reviewed with return demo. She was encouraged to perform her HEP everyday, to sit up on the chair with nursing assistance and to ambulate with nursing staff to assist in her recovery process.   Recommended D Pt with low Hgb today 7.4, awaiting for MD decision regarding blood transfusion per RN. Will follow up when pt appropriated. [AS.1]      Attribution Bhatt    AS. 1 - Lucila Huizar on 10/27/2018  1:09 PM                        Occupational Therapy Notes (last manage le dressing tasks. Pt w/ potential to be self limiting and required much encouraegement to remain up in the chair or participate in oob activities. At end of session pt remaining up in chair w/ all needs in reach; at bedside.  BP at end of ses Stairs in Home:[KINA.1] 3 stairs to enter;tri level w/ 10 stairs between floors[KINA.2]  Assistive Device(s) Used:[KINA.1] none[KINA.2]     Prior Level of [place]:[KINA.1] Pta pt was living w/ her  in a tri level home.  Pt reports being independent w/ ald BALANCE ASSESSMENT  Static Sitting:[KINA.1] independent[KINA.2]  Dynamic Sitting:[KINA.1] na[KINA.2]  Static Standing:[KINA.1] rw and cga[KINA.2]  Dynamic Standing:[KINA.1] rw and cga[KINA.2]  FUNCTIONAL ADL ASSESSMENT  Grooming:[KINA.1] independent[KINA.2]  Feeding:[KINA.1] in

## (undated) NOTE — LETTER
Chalo Nicole [address], [place], [place]  [address]  INFORMED CONSENT FOR TRANSFUSION OF BLOOD OR BLOOD PRODUCTS  My physician has informed me of the nature, purpose, benefits and risks of transfusion for blood and blood components that he/she may deem necessary during my treatment or hospitalization. He/she has also discussed alternatives to receiving blood from the voluntary blood supply with me, such as self-donation (autologous) and directed donation (blood donated by family or friends to be used specifically for me). I further understand that while the [address] [place] [address] will attempt to supply any autologous or directed donor blood prior to transfusion of blood from the routine blood supply, medical circumstances may require that other or additional blood components may be required for my care. In giving consent, I acknowledge that my physician has also informed me that despite careful screening and testing in accordance with national and regional regulations, there is still a small risk of transmission of infectious agents including hepatitis, HIV-1/2, cytomegalovirus and other viruses or diseases as yet unknown for which licensed definitive screening tests do not currently exist. Additionally, my physician has informed me of the potential for transfusion reactions not related to an infectious agent. [  ]  Check here for Recurring Outpatient Transfusion Therapy (valid for 1 year) In addition to the above, my physician has informed me that I shall receive numerous transfusions over a period of time and that these can lead to other increased risks. I hereby authorize the transfusion of blood and/or blood products to me as deemed necessary and ordered by physicians participating in my care.  My physician has given me the opportunity to ask questions and any questions asked have been answered to my satisfaction  __________________________________________ ______________________________________________  (Signature of Patient)                                                            (Responsible party in case of Minor,                                                                                                 Incompetent, or unconscious Patient)  ___________________________________________       ________ ______________________________________  (Relationship to Patient)                                                       (Signature of Witness)  ______________________________________________________________________________________________   (Date)                                                                           (Time)  REFUSAL OF CONSENT FOR BLOOD TRANSFUSIONS   Sign only if Refusing   [  ] I understand refusal of blood or blood products as deemed necessary by my physician may have serious consequences to my condition to include possible death.  I hereby assume responsibility for my refusal and release the hospital, its personnel, and my physicians from any responsibility for the consequences of my refusal.    ________________________________________________________________________________  (Signature of Patient)                                                         (Responsible Party/Relationship to Patient)    ________________________________________________________________________________  (Signature of Witness)                                                       (Date/Time)     Patient Name: Kolby Fan     : 1945                 Printed: 2023      Medical Record #: [id number]                                 Page 1 of 1